# Patient Record
Sex: MALE | Race: WHITE | NOT HISPANIC OR LATINO | Employment: OTHER | URBAN - METROPOLITAN AREA
[De-identification: names, ages, dates, MRNs, and addresses within clinical notes are randomized per-mention and may not be internally consistent; named-entity substitution may affect disease eponyms.]

---

## 2018-03-27 ENCOUNTER — OFFICE VISIT (OUTPATIENT)
Dept: INTERNAL MEDICINE | Facility: CLINIC | Age: 63
End: 2018-03-27

## 2018-03-27 VITALS
SYSTOLIC BLOOD PRESSURE: 142 MMHG | BODY MASS INDEX: 28.57 KG/M2 | DIASTOLIC BLOOD PRESSURE: 88 MMHG | WEIGHT: 242 LBS | OXYGEN SATURATION: 95 % | HEART RATE: 80 BPM | TEMPERATURE: 98.7 F | HEIGHT: 77 IN | RESPIRATION RATE: 16 BRPM

## 2018-03-27 DIAGNOSIS — Z00.00 ROUTINE ADULT HEALTH MAINTENANCE: Primary | ICD-10-CM

## 2018-03-27 LAB
ALBUMIN SERPL-MCNC: 4.6 G/DL (ref 3.5–5.2)
ALBUMIN/GLOB SERPL: 1.8 G/DL
ALP SERPL-CCNC: 93 U/L (ref 40–129)
ALT SERPL-CCNC: 47 U/L (ref 5–41)
AST SERPL-CCNC: 34 U/L (ref 5–40)
BASOPHILS # BLD AUTO: 0.11 10*3/MM3 (ref 0–0.2)
BASOPHILS NFR BLD AUTO: 1.5 % (ref 0–2)
BILIRUB SERPL-MCNC: 0.9 MG/DL (ref 0.2–1.2)
BUN SERPL-MCNC: 12 MG/DL (ref 8–23)
BUN/CREAT SERPL: 14.1 (ref 7–25)
CALCIUM SERPL-MCNC: 9.8 MG/DL (ref 8.8–10.5)
CHLORIDE SERPL-SCNC: 99 MMOL/L (ref 98–107)
CHOLEST SERPL-MCNC: 243 MG/DL (ref 0–200)
CO2 SERPL-SCNC: 30.1 MMOL/L (ref 22–29)
CREAT SERPL-MCNC: 0.85 MG/DL (ref 0.76–1.27)
EOSINOPHIL # BLD AUTO: 0.15 10*3/MM3 (ref 0.1–0.3)
EOSINOPHIL NFR BLD AUTO: 2 % (ref 0–4)
ERYTHROCYTE [DISTWIDTH] IN BLOOD BY AUTOMATED COUNT: 12.9 % (ref 11.5–14.5)
GFR SERPLBLD CREATININE-BSD FMLA CKD-EPI: 111 ML/MIN/1.73
GFR SERPLBLD CREATININE-BSD FMLA CKD-EPI: 91 ML/MIN/1.73
GLOBULIN SER CALC-MCNC: 2.6 GM/DL
GLUCOSE SERPL-MCNC: 106 MG/DL (ref 65–99)
HBA1C MFR BLD: 5.7 % (ref 4.8–5.6)
HCT VFR BLD AUTO: 49.7 % (ref 42–52)
HDLC SERPL-MCNC: 54 MG/DL (ref 40–60)
HGB BLD-MCNC: 16.7 G/DL (ref 14–18)
IMM GRANULOCYTES # BLD: 0.02 10*3/MM3 (ref 0–0.03)
IMM GRANULOCYTES NFR BLD: 0.3 % (ref 0–0.5)
LDLC SERPL CALC-MCNC: 139 MG/DL (ref 0–100)
LDLC/HDLC SERPL: 2.57 {RATIO}
LYMPHOCYTES # BLD AUTO: 2.14 10*3/MM3 (ref 0.6–4.8)
LYMPHOCYTES NFR BLD AUTO: 28.8 % (ref 20–45)
MCH RBC QN AUTO: 32 PG (ref 27–31)
MCHC RBC AUTO-ENTMCNC: 33.6 G/DL (ref 31–37)
MCV RBC AUTO: 95.2 FL (ref 80–94)
MONOCYTES # BLD AUTO: 0.81 10*3/MM3 (ref 0–1)
MONOCYTES NFR BLD AUTO: 10.9 % (ref 3–8)
NEUTROPHILS # BLD AUTO: 4.21 10*3/MM3 (ref 1.5–8.3)
NEUTROPHILS NFR BLD AUTO: 56.5 % (ref 45–70)
NRBC BLD AUTO-RTO: 0 /100 WBC (ref 0–0)
PLATELET # BLD AUTO: 283 10*3/MM3 (ref 140–500)
POTASSIUM SERPL-SCNC: 4.5 MMOL/L (ref 3.5–5.2)
PROT SERPL-MCNC: 7.2 G/DL (ref 6–8.5)
PSA SERPL-MCNC: 1.75 NG/ML (ref 0–4)
RBC # BLD AUTO: 5.22 10*6/MM3 (ref 4.7–6.1)
SODIUM SERPL-SCNC: 141 MMOL/L (ref 136–145)
TRIGL SERPL-MCNC: 251 MG/DL (ref 0–150)
TSH SERPL DL<=0.005 MIU/L-ACNC: 3.31 MIU/ML (ref 0.27–4.2)
VLDLC SERPL CALC-MCNC: 50.2 MG/DL (ref 8–32)
WBC # BLD AUTO: 7.44 10*3/MM3 (ref 4.8–10.8)

## 2018-03-27 PROCEDURE — 93000 ELECTROCARDIOGRAM COMPLETE: CPT | Performed by: INTERNAL MEDICINE

## 2018-03-27 PROCEDURE — 99386 PREV VISIT NEW AGE 40-64: CPT | Performed by: INTERNAL MEDICINE

## 2018-03-27 NOTE — PROGRESS NOTES
"      Juventino Johnson is a 62 y.o. male, who presents with a chief complaint of   Chief Complaint   Patient presents with   • [facility]       HPI     61 yo male witih no self reported chronic problem. He has some degenerative disc disease in his lower back. Going to chiropractor regularly. Rarely takes excedrin.     Has occasional internal type hip pain every 4 months, severe, walks off. Very rare, not present today    Rarely gets reflux symptoms at night. No cp or exertional symptoms.        The following portions of the patient's history were reviewed and updated as appropriate: allergies, current medications, past family history, past medical history, past social history, past surgical history and problem list.    Allergies: Review of patient's allergies indicates no known allergies.  No current outpatient prescriptions on file.  There are no discontinued medications.    Review of Systems   Constitutional: Negative.  Negative for unexpected weight change.   Eyes: Negative.    Respiratory: Negative.    Cardiovascular: Negative.  Negative for chest pain, palpitations and leg swelling.   Gastrointestinal:        Reflux at night   Genitourinary: Negative.    Musculoskeletal: Negative.    Neurological: Positive for headaches.   Hematological: Negative.    Psychiatric/Behavioral: Negative.  Negative for sleep disturbance.   All other systems reviewed and are negative.            /88   Pulse 80   Temp 98.7 °F (37.1 °C)   Resp 16   Ht 195.6 cm (77\")   Wt 110 kg (242 lb)   SpO2 95%   BMI 28.70 kg/m²       Physical Exam   Constitutional: He is oriented to person, place, and time. He appears well-developed and well-nourished.   HENT:   Head: Normocephalic and atraumatic.   Right Ear: External ear normal.   Left Ear: External ear normal.   Nose: Nose normal.   Mouth/Throat: No oropharyngeal exudate.   Eyes: Conjunctivae and EOM are normal. Pupils are equal, round, and reactive to light.   Neck: Normal range of " motion. Neck supple. No JVD present. No tracheal deviation present. No thyromegaly present.   Cardiovascular: Normal rate, regular rhythm, normal heart sounds and intact distal pulses.  Exam reveals no gallop and no friction rub.    No murmur heard.  Pulmonary/Chest: Effort normal and breath sounds normal.   Abdominal: Soft. Bowel sounds are normal. He exhibits no distension and no mass. There is no tenderness. There is no rebound and no guarding. No hernia.   Genitourinary: Prostate normal and penis normal. Rectal exam shows guaiac negative stool. No penile tenderness.   Musculoskeletal: Normal range of motion. He exhibits no edema.   Neurological: He is alert and oriented to person, place, and time. He has normal reflexes.   Skin: Skin is warm and dry.   Psychiatric: He has a normal mood and affect. His behavior is normal. Judgment and thought content normal.   Nursing note and vitals reviewed.      Lab Results (most recent)     None        ECG 12 Lead  Date/Time: 3/27/2018 2:31 PM  Performed by: ASIA MEZA  Authorized by: ASIA MEZA   Comparison: not compared with previous ECG   Previous ECG: no previous ECG available  Rate: normal  BPM: 81  Conduction: conduction normal  ST Segments: ST segments normal  T Waves: T waves normal  QRS axis: normal  Clinical impression: normal ECG              No results found for this or any previous visit.        Juventino was seen today for establish care.    Diagnoses and all orders for this visit:    Routine adult health maintenance  -     Comprehensive metabolic panel  -     TSH  -     PSA SCREENING  -     Lipid Panel With LDL/HDL Ratio  -     CBC w AUTO Differential  -     Hemoglobin A1c  -     DEXA Assess Fracture Vertebral      Declining vaccination up date today  Colonoscopy up to date  Reflux likely zantac 150 mg at night prn   EKG today reasuring  Monitor bp at home, goal <140/90.     Return in about 6 months (around 9/27/2018).    Asia Meza,  MD  03/27/2018

## 2018-03-27 NOTE — PATIENT INSTRUCTIONS
Juventino was seen today for establish care.    Diagnoses and all orders for this visit:    Routine adult health maintenance  -     Comprehensive metabolic panel  -     TSH  -     PSA SCREENING  -     Lipid Panel With LDL/HDL Ratio  -     CBC w AUTO Differential  -     Hemoglobin A1c  -     DEXA Assess Fracture Vertebral          Return in about 6 months (around 9/27/2018).    Gonzalez Meza MD  03/27/2018    Preventive Care 40-64 Years, Male  Preventive care refers to lifestyle choices and visits with your health care provider that can promote health and wellness.  What does preventive care include?  · A yearly physical exam. This is also called an annual well check.  · Dental exams once or twice a year.  · Routine eye exams. Ask your health care provider how often you should have your eyes checked.  · Personal lifestyle choices, including:  ¨ Daily care of your teeth and gums.  ¨ Regular physical activity.  ¨ Eating a healthy diet.  ¨ Avoiding tobacco and drug use.  ¨ Limiting alcohol use.  ¨ Practicing safe sex.  ¨ Taking low-dose aspirin every day starting at age 50.  What happens during an annual well check?  The services and screenings done by your health care provider during your annual well check will depend on your age, overall health, lifestyle risk factors, and family history of disease.  Counseling   Your health care provider may ask you questions about your:  · Alcohol use.  · Tobacco use.  · Drug use.  · Emotional well-being.  · Home and relationship well-being.  · Sexual activity.  · Eating habits.  · Work and work environment.  Screening   You may have the following tests or measurements:  · Height, weight, and BMI.  · Blood pressure.  · Lipid and cholesterol levels. These may be checked every 5 years, or more frequently if you are over 50 years old.  · Skin check.  · Lung cancer screening. You may have this screening every year starting at age 55 if you have a 30-pack-year history of smoking and  currently smoke or have quit within the past 15 years.  · Fecal occult blood test (FOBT) of the stool. You may have this test every year starting at age 50.  · Flexible sigmoidoscopy or colonoscopy. You may have a sigmoidoscopy every 5 years or a colonoscopy every 10 years starting at age 50.  · Prostate cancer screening. Recommendations will vary depending on your family history and other risks.  · Hepatitis C blood test.  · Hepatitis B blood test.  · Sexually transmitted disease (STD) testing.  · Diabetes screening. This is done by checking your blood sugar (glucose) after you have not eaten for a while (fasting). You may have this done every 1-3 years.  Discuss your test results, treatment options, and if necessary, the need for more tests with your health care provider.  Vaccines   Your health care provider may recommend certain vaccines, such as:  · Influenza vaccine. This is recommended every year.  · Tetanus, diphtheria, and acellular pertussis (Tdap, Td) vaccine. You may need a Td booster every 10 years.  · Varicella vaccine. You may need this if you have not been vaccinated.  · Zoster vaccine. You may need this after age 60.  · Measles, mumps, and rubella (MMR) vaccine. You may need at least one dose of MMR if you were born in 1957 or later. You may also need a second dose.  · Pneumococcal 13-valent conjugate (PCV13) vaccine. You may need this if you have certain conditions and have not been vaccinated.  · Pneumococcal polysaccharide (PPSV23) vaccine. You may need one or two doses if you smoke cigarettes or if you have certain conditions.  · Meningococcal vaccine. You may need this if you have certain conditions.  · Hepatitis A vaccine. You may need this if you have certain conditions or if you travel or work in places where you may be exposed to hepatitis A.  · Hepatitis B vaccine. You may need this if you have certain conditions or if you travel or work in places where you may be exposed to hepatitis  B.  · Haemophilus influenzae type b (Hib) vaccine. You may need this if you have certain risk factors.  Talk to your health care provider about which screenings and vaccines you need and how often you need them.  This information is not intended to replace advice given to you by your health care provider. Make sure you discuss any questions you have with your health care provider.  Document Released: 01/13/2017 Document Revised: 09/06/2017 Document Reviewed: 10/18/2016  Elsevier Interactive Patient Education © 2017 [employer] Inc.

## 2018-03-29 ENCOUNTER — TELEPHONE (OUTPATIENT)
Dept: INTERNAL MEDICINE | Facility: CLINIC | Age: 63
End: 2018-03-29

## 2018-03-29 NOTE — TELEPHONE ENCOUNTER
KAREN on patient VM returning his call.  Advised him to call me back again.    ----- Message from Luzma Paul MA sent at 3/29/2018  1:42 PM EDT -----  Regarding: FW: TEST RESULTS      ----- Message -----  From: Karina Prado  Sent: 3/29/2018  12:05 PM  To: [facility]  Subject: TEST RESULTS                                     kindig    Patient says he got message to return call about test results.      [phone]

## 2018-04-04 ENCOUNTER — TELEPHONE (OUTPATIENT)
Dept: INTERNAL MEDICINE | Facility: CLINIC | Age: 63
End: 2018-04-04

## 2018-04-04 NOTE — TELEPHONE ENCOUNTER
----- Message from Gonzalez Meza MD sent at 3/28/2018  8:02 AM EDT -----  Labs are okay.   He likely has a little B12 deficiency.  This is from daily alcohol use. He has a slight elevation in one liver enzyme as well. His chol is very high and he does qualify for medication if he is willing. Would do crestor 5 mg daily if he agrees, then needs cmp in 6 weeks. And FU 3 months.  HE was new patient so let me know what he decides.    Pt was given info and will be in office Friday o4/06/2018 dg

## 2018-04-05 ENCOUNTER — OFFICE VISIT (OUTPATIENT)
Dept: INTERNAL MEDICINE | Facility: CLINIC | Age: 63
End: 2018-04-05

## 2018-04-05 VITALS
RESPIRATION RATE: 18 BRPM | DIASTOLIC BLOOD PRESSURE: 88 MMHG | WEIGHT: 242.4 LBS | OXYGEN SATURATION: 98 % | SYSTOLIC BLOOD PRESSURE: 148 MMHG | BODY MASS INDEX: 28.62 KG/M2 | HEART RATE: 86 BPM | HEIGHT: 77 IN

## 2018-04-05 DIAGNOSIS — E78.2 MIXED HYPERLIPIDEMIA: ICD-10-CM

## 2018-04-05 DIAGNOSIS — Z78.9 ALCOHOL USE: ICD-10-CM

## 2018-04-05 DIAGNOSIS — R74.01 ELEVATED ALT MEASUREMENT: ICD-10-CM

## 2018-04-05 DIAGNOSIS — R73.02 GLUCOSE INTOLERANCE (IMPAIRED GLUCOSE TOLERANCE): Primary | ICD-10-CM

## 2018-04-05 DIAGNOSIS — I10 ESSENTIAL HYPERTENSION: ICD-10-CM

## 2018-04-05 PROBLEM — F10.90 ALCOHOL USE: Status: ACTIVE | Noted: 2018-04-05

## 2018-04-05 PROCEDURE — 99214 OFFICE O/P EST MOD 30 MIN: CPT | Performed by: INTERNAL MEDICINE

## 2018-04-05 NOTE — PATIENT INSTRUCTIONS
"Take multivitamin, B12 vitamin complex, folate, and thiamine for appropriate nutrition.    Work on decreasing alcohol intake.     Work on doing 10,000 steps of exercise/day.     Limit carbohydrates (breads, rice, pasta).      Keep blood pressure log 3 times weekly, goal <130/80 and call office with results in 1 week (you may need to start a medication to lower blood pressure).  When you check your blood pressure, check it first thing in the morning after sitting down for 15 minutes.  Do not consume any caffeine prior to checking your blood pressure.     DASH Eating Plan  DASH stands for \"Dietary Approaches to Stop Hypertension.\" The DASH eating plan is a healthy eating plan that has been shown to reduce high blood pressure (hypertension). It may also reduce your risk for type 2 diabetes, heart disease, and stroke. The DASH eating plan may also help with weight loss.  What are tips for following this plan?  General guidelines   · Avoid eating more than 2,300 mg (milligrams) of salt (sodium) a day. If you have hypertension, you may need to reduce your sodium intake to 1,500 mg a day.  · Limit alcohol intake to no more than 1 drink a day for nonpregnant women and 2 drinks a day for men. One drink equals 12 oz of beer, 5 oz of wine, or 1½ oz of hard liquor.  · Work with your health care provider to maintain a healthy body weight or to lose weight. Ask what an ideal weight is for you.  · Get at least 30 minutes of exercise that causes your heart to beat faster (aerobic exercise) most days of the week. Activities may include walking, swimming, or biking.  · Work with your health care provider or diet and nutrition specialist (dietitian) to adjust your eating plan to your individual calorie needs.  Reading food labels   · Check food labels for the amount of sodium per serving. Choose foods with less than 5 percent of the Daily Value of sodium. Generally, foods with less than 300 mg of sodium per serving fit into this " "eating plan.  · To find whole grains, look for the word \"whole\" as the first word in the ingredient list.  Shopping   · Buy products labeled as \"low-sodium\" or \"no salt added.\"  · Buy fresh foods. Avoid canned foods and premade or frozen meals.  Cooking   · Avoid adding salt when cooking. Use salt-free seasonings or herbs instead of table salt or sea salt. Check with your health care provider or pharmacist before using salt substitutes.  · Do not gage foods. Cook foods using healthy methods such as baking, boiling, grilling, and broiling instead.  · Cook with heart-healthy oils, such as olive, canola, soybean, or sunflower oil.  Meal planning     · Eat a balanced diet that includes:  ¨ 5 or more servings of fruits and vegetables each day. At each meal, try to fill half of your plate with fruits and vegetables.  ¨ Up to 6-8 servings of whole grains each day.  ¨ Less than 6 oz of lean meat, poultry, or fish each day. A 3-oz serving of meat is about the same size as a deck of cards. One egg equals 1 oz.  ¨ 2 servings of low-fat dairy each day.  ¨ A serving of nuts, seeds, or beans 5 times each week.  ¨ Heart-healthy fats. Healthy fats called Omega-3 fatty acids are found in foods such as flaxseeds and coldwater fish, like sardines, salmon, and mackerel.  · Limit how much you eat of the following:  ¨ Canned or prepackaged foods.  ¨ Food that is high in trans fat, such as fried foods.  ¨ Food that is high in saturated fat, such as fatty meat.  ¨ Sweets, desserts, sugary drinks, and other foods with added sugar.  ¨ Full-fat dairy products.  · Do not salt foods before eating.  · Try to eat at least 2 vegetarian meals each week.  · Eat more home-cooked food and less restaurant, buffet, and fast food.  · When eating at a restaurant, ask that your food be prepared with less salt or no salt, if possible.  What foods are recommended?  The items listed may not be a complete list. Talk with your dietitian about what dietary " choices are best for you.  Grains   Whole-grain or whole-wheat bread. Whole-grain or whole-wheat pasta. Brown rice. Oatmeal. Quinoa. Bulgur. Whole-grain and low-sodium cereals. Birdie bread. Low-fat, low-sodium crackers. Whole-wheat flour tortillas.  Vegetables   Fresh or frozen vegetables (raw, steamed, roasted, or grilled). Low-sodium or reduced-sodium tomato and vegetable juice. Low-sodium or reduced-sodium tomato sauce and tomato paste. Low-sodium or reduced-sodium canned vegetables.  Fruits   All fresh, dried, or frozen fruit. Canned fruit in natural juice (without added sugar).  Meat and other protein foods   Skinless chicken or turkey. Ground chicken or turkey. Pork with fat trimmed off. Fish and seafood. Egg whites. Dried beans, peas, or lentils. Unsalted nuts, nut butters, and seeds. Unsalted canned beans. Lean cuts of beef with fat trimmed off. Low-sodium, lean deli meat.  Dairy   Low-fat (1%) or fat-free (skim) milk. Fat-free, low-fat, or reduced-fat cheeses. Nonfat, low-sodium ricotta or cottage cheese. Low-fat or nonfat yogurt. Low-fat, low-sodium cheese.  Fats and oils   Soft margarine without trans fats. Vegetable oil. Low-fat, reduced-fat, or light mayonnaise and salad dressings (reduced-sodium). Canola, safflower, olive, soybean, and sunflower oils. Avocado.  Seasoning and other foods   Herbs. Spices. Seasoning mixes without salt. Unsalted popcorn and pretzels. Fat-free sweets.  What foods are not recommended?  The items listed may not be a complete list. Talk with your dietitian about what dietary choices are best for you.  Grains   Baked goods made with fat, such as croissants, muffins, or some breads. Dry pasta or rice meal packs.  Vegetables   Creamed or fried vegetables. Vegetables in a cheese sauce. Regular canned vegetables (not low-sodium or reduced-sodium). Regular canned tomato sauce and paste (not low-sodium or reduced-sodium). Regular tomato and vegetable juice (not low-sodium or  reduced-sodium). Pickles. Olives.  Fruits   Canned fruit in a light or heavy syrup. Fried fruit. Fruit in cream or butter sauce.  Meat and other protein foods   Fatty cuts of meat. Ribs. Fried meat. Sauceda. Sausage. Bologna and other processed lunch meats. Salami. Fatback. Hotdogs. Bratwurst. Salted nuts and seeds. Canned beans with added salt. Canned or smoked fish. Whole eggs or egg yolks. Chicken or turkey with skin.  Dairy   Whole or 2% milk, cream, and half-and-half. Whole or full-fat cream cheese. Whole-fat or sweetened yogurt. Full-fat cheese. Nondairy creamers. Whipped toppings. Processed cheese and cheese spreads.  Fats and oils   Butter. Stick margarine. Lard. Shortening. Ghee. Sauceda fat. Tropical oils, such as coconut, palm kernel, or palm oil.  Seasoning and other foods   Salted popcorn and pretzels. Onion salt, garlic salt, seasoned salt, table salt, and sea salt. Worcestershire sauce. Tartar sauce. Barbecue sauce. Teriyaki sauce. Soy sauce, including reduced-sodium. Steak sauce. Canned and packaged gravies. Fish sauce. Oyster sauce. Cocktail sauce. Horseradish that you find on the shelf. Ketchup. Mustard. Meat flavorings and tenderizers. Bouillon cubes. Hot sauce and Tabasco sauce. Premade or packaged marinades. Premade or packaged taco seasonings. Relishes. Regular salad dressings.  Where to find more information:  · National Heart, Lung, and Blood [place]: www.nhlbi.nih.gov  · American Heart Association: www.heart.org  Summary  · The DASH eating plan is a healthy eating plan that has been shown to reduce high blood pressure (hypertension). It may also reduce your risk for type 2 diabetes, heart disease, and stroke.  · With the DASH eating plan, you should limit salt (sodium) intake to 2,300 mg a day. If you have hypertension, you may need to reduce your sodium intake to 1,500 mg a day.  · When on the DASH eating plan, aim to eat more fresh fruits and vegetables, whole grains, lean proteins, low-fat  dairy, and heart-healthy fats.  · Work with your health care provider or diet and nutrition specialist (dietitian) to adjust your eating plan to your individual calorie needs.  This information is not intended to replace advice given to you by your health care provider. Make sure you discuss any questions you have with your health care provider.  Document Released: 12/06/2012 Document Revised: 12/11/2017 Document Reviewed: 12/11/2017  [employer] Interactive Patient Education © 2017 Elsevier Inc.

## 2018-04-05 NOTE — PROGRESS NOTES
Subjective   Juventino Johnson is a 62 y.o. male.     History of Present Illness     The following portions of the patient's history were reviewed and updated as appropriate: allergies, current medications, past family history, past medical history, past social history, past surgical history and problem list.    Juventino Johnson is a 62 y.o. male who presents for follow-up.  Here to discuss lab results.  He received a call and knows his B12 is abnormal and cholesterol is abnormal.  He states he eats a lot of meats (fish, lamb, red, white), eggs.  He admits to not eating a lot of salads.  He states he drinks coffee, juice, wine (2 glasses of wine a day, beers for lunch).  Denies TOB, illicits.  He doesn't exercise.  No FH of heart disease. He would like to try conservative methods first to improve (dieting and exercise).      Review of Systems   Constitutional: Negative for activity change, appetite change and fever.   HENT: Negative for congestion and rhinorrhea.    Eyes: Negative for pain and discharge.   Respiratory: Negative for chest tightness, shortness of breath and wheezing.    Cardiovascular: Negative for chest pain, palpitations and leg swelling.   Gastrointestinal: Negative for abdominal pain, constipation, diarrhea, nausea and vomiting.   Endocrine: Positive for heat intolerance. Negative for cold intolerance.   Genitourinary: Negative for dysuria and hematuria.   Musculoskeletal: Negative for back pain and gait problem.   Skin: Negative for rash and wound.   Neurological: Negative for dizziness, syncope and light-headedness.   Hematological: Negative for adenopathy. Does not bruise/bleed easily.   Psychiatric/Behavioral: Negative for sleep disturbance and suicidal ideas.       Objective   Physical Exam   Constitutional: He is oriented to person, place, and time. He appears well-developed and well-nourished. No distress.   HENT:   Head: Normocephalic and atraumatic.   Right Ear: External ear normal.   Left Ear:  External ear normal.   Nose: Nose normal.   Mouth/Throat: Oropharynx is clear and moist. No oropharyngeal exudate.   Eyes: Conjunctivae and EOM are normal. Pupils are equal, round, and reactive to light. Right eye exhibits no discharge. Left eye exhibits no discharge. No scleral icterus.   Neck: Normal range of motion. Neck supple. No JVD present. No thyromegaly present.   Cardiovascular: Normal rate, regular rhythm, normal heart sounds and intact distal pulses.  Exam reveals no gallop and no friction rub.    No murmur heard.  Pulmonary/Chest: Effort normal and breath sounds normal. No respiratory distress. He has no wheezes. He has no rales. He exhibits no tenderness.   Abdominal: Soft. Bowel sounds are normal. He exhibits no distension and no mass. There is no tenderness. There is no rebound and no guarding. No hernia.   Musculoskeletal: Normal range of motion. He exhibits no tenderness.   Lymphadenopathy:     He has no cervical adenopathy.   Neurological: He is alert and oriented to person, place, and time. He exhibits normal muscle tone.   Skin: Skin is warm and dry. No rash noted. He is not diaphoretic.   Psychiatric: He has a normal mood and affect. His behavior is normal.   Nursing note and vitals reviewed.    Assessment/Plan   Juventino was seen today for hyperlipidemia.    Diagnoses and all orders for this visit:    Glucose intolerance (impaired glucose tolerance)    Mixed hyperlipidemia    Essential hypertension    Elevated ALT measurement    Alcohol use      Juventino Johnson is a 62 y.o. male who presents for follow-up  Labs indicate ALT of 47, MCV of 95%, HbA1c of 5.7%, glc 106.  Cholesterol is 243, trig 251, .  BP elevated.     HLD:  - ASCVD is 14%.  Counseled patient on benefits of statin medication but patient refused medical therapy at this time.  I also counseled patient on diet/exercise benefits for health and lab abnormalities.  Patient prefers not to pursue medical therapy at this time but would  like to try diet/exercise first.  Will see back in 3-6 months and reassess efforts with repeat labs.  If unable to make good strides, will need to institute medical therapy.  Patient voices understanding of this.      ETOH use:   - With regards to alcohol use, advised him to cut back and take MV with B12, folate, and thiamine given elevated MCV and history of drug use.      Impaired fasting glc:  - Instructed pt on diet/exercise    HTN:  - Instructed pt to keep BP log and call office in 1 week.   - Counseled on diet/exercise     Juliano Mckinnon MD  Resident provider   PGY-4  IM/Ped    Patient seen and examined with resident physician, as well as independently of resident physician. Agree with assessment and plan.    Wrap up addended.  Recommend Fu lab only 3 month and FU 6 month  Spent 25 min in counseling and lab review of patient.

## 2018-07-09 DIAGNOSIS — R74.01 ELEVATED ALT MEASUREMENT: ICD-10-CM

## 2018-07-09 DIAGNOSIS — E78.2 MIXED HYPERLIPIDEMIA: ICD-10-CM

## 2018-07-09 DIAGNOSIS — R73.02 GLUCOSE INTOLERANCE (IMPAIRED GLUCOSE TOLERANCE): ICD-10-CM

## 2018-07-09 DIAGNOSIS — I10 ESSENTIAL HYPERTENSION: Primary | ICD-10-CM

## 2018-07-09 DIAGNOSIS — Z78.9 ALCOHOL USE: ICD-10-CM

## 2018-07-10 ENCOUNTER — LAB (OUTPATIENT)
Dept: INTERNAL MEDICINE | Facility: CLINIC | Age: 63
End: 2018-07-10

## 2018-07-10 DIAGNOSIS — E78.2 MIXED HYPERLIPIDEMIA: ICD-10-CM

## 2018-07-10 DIAGNOSIS — Z78.9 ALCOHOL USE: ICD-10-CM

## 2018-07-10 DIAGNOSIS — R73.02 GLUCOSE INTOLERANCE (IMPAIRED GLUCOSE TOLERANCE): ICD-10-CM

## 2018-07-10 DIAGNOSIS — R74.01 ELEVATED ALT MEASUREMENT: ICD-10-CM

## 2018-07-10 DIAGNOSIS — I10 ESSENTIAL HYPERTENSION: ICD-10-CM

## 2018-07-10 LAB
ALBUMIN SERPL-MCNC: 4.3 G/DL (ref 3.5–5.2)
ALBUMIN/GLOB SERPL: 1.6 G/DL
ALP SERPL-CCNC: 77 U/L (ref 40–129)
ALT SERPL-CCNC: 29 U/L (ref 5–41)
AST SERPL-CCNC: 29 U/L (ref 5–40)
BASOPHILS # BLD AUTO: 0.1 10*3/MM3 (ref 0–0.2)
BASOPHILS NFR BLD AUTO: 1.4 % (ref 0–2)
BILIRUB SERPL-MCNC: 0.8 MG/DL (ref 0.2–1.2)
BUN SERPL-MCNC: 10 MG/DL (ref 8–23)
BUN/CREAT SERPL: 13.3 (ref 7–25)
CALCIUM SERPL-MCNC: 9.6 MG/DL (ref 8.8–10.5)
CHLORIDE SERPL-SCNC: 104 MMOL/L (ref 98–107)
CHOLEST SERPL-MCNC: 236 MG/DL (ref 0–200)
CO2 SERPL-SCNC: 28.2 MMOL/L (ref 22–29)
CREAT SERPL-MCNC: 0.75 MG/DL (ref 0.76–1.27)
EOSINOPHIL # BLD AUTO: 0.25 10*3/MM3 (ref 0.1–0.3)
EOSINOPHIL NFR BLD AUTO: 3.5 % (ref 0–4)
ERYTHROCYTE [DISTWIDTH] IN BLOOD BY AUTOMATED COUNT: 12.6 % (ref 11.5–14.5)
GLOBULIN SER CALC-MCNC: 2.7 GM/DL
GLUCOSE SERPL-MCNC: 110 MG/DL (ref 65–99)
HCT VFR BLD AUTO: 50.5 % (ref 42–52)
HDLC SERPL-MCNC: 50 MG/DL (ref 40–60)
HGB BLD-MCNC: 16.9 G/DL (ref 14–18)
IMM GRANULOCYTES # BLD: 0.01 10*3/MM3 (ref 0–0.03)
IMM GRANULOCYTES NFR BLD: 0.1 % (ref 0–0.5)
LDLC SERPL CALC-MCNC: 134 MG/DL (ref 0–100)
LDLC/HDLC SERPL: 2.68 {RATIO}
LYMPHOCYTES # BLD AUTO: 2.6 10*3/MM3 (ref 0.6–4.8)
LYMPHOCYTES NFR BLD AUTO: 36.9 % (ref 20–45)
MCH RBC QN AUTO: 32.2 PG (ref 27–31)
MCHC RBC AUTO-ENTMCNC: 33.5 G/DL (ref 31–37)
MCV RBC AUTO: 96.2 FL (ref 80–94)
MONOCYTES # BLD AUTO: 0.73 10*3/MM3 (ref 0–1)
MONOCYTES NFR BLD AUTO: 10.4 % (ref 3–8)
NEUTROPHILS # BLD AUTO: 3.36 10*3/MM3 (ref 1.5–8.3)
NEUTROPHILS NFR BLD AUTO: 47.7 % (ref 45–70)
NRBC BLD AUTO-RTO: 0 /100 WBC (ref 0–0)
PLATELET # BLD AUTO: 261 10*3/MM3 (ref 140–500)
POTASSIUM SERPL-SCNC: 4.4 MMOL/L (ref 3.5–5.2)
PROT SERPL-MCNC: 7 G/DL (ref 6–8.5)
RBC # BLD AUTO: 5.25 10*6/MM3 (ref 4.7–6.1)
SODIUM SERPL-SCNC: 141 MMOL/L (ref 136–145)
TRIGL SERPL-MCNC: 260 MG/DL (ref 0–150)
VIT B12 SERPL-MCNC: 475 PG/ML
VLDLC SERPL CALC-MCNC: 52 MG/DL (ref 8–32)
WBC # BLD AUTO: 7.05 10*3/MM3 (ref 4.8–10.8)

## 2018-07-20 ENCOUNTER — TELEPHONE (OUTPATIENT)
Dept: INTERNAL MEDICINE | Facility: CLINIC | Age: 63
End: 2018-07-20

## 2018-07-20 NOTE — TELEPHONE ENCOUNTER
----- Message from Gonzalez Meza MD sent at 7/13/2018  5:19 PM EDT -----  Cholesterol ratio still high. Would recommend statin. He could start fish oil 1000 twice daily to garcia the triglycerides. The labs are not really different.    Pt requested info on pain management in and around [facility]. The one she is going to is closing.dg

## 2018-07-23 ENCOUNTER — OFFICE VISIT (OUTPATIENT)
Dept: INTERNAL MEDICINE | Facility: CLINIC | Age: 63
End: 2018-07-23

## 2018-07-23 VITALS
HEIGHT: 77 IN | RESPIRATION RATE: 14 BRPM | OXYGEN SATURATION: 98 % | BODY MASS INDEX: 26.33 KG/M2 | WEIGHT: 223 LBS | TEMPERATURE: 98.6 F | SYSTOLIC BLOOD PRESSURE: 136 MMHG | HEART RATE: 82 BPM | DIASTOLIC BLOOD PRESSURE: 82 MMHG

## 2018-07-23 DIAGNOSIS — E78.2 MIXED HYPERLIPIDEMIA: Primary | ICD-10-CM

## 2018-07-23 DIAGNOSIS — I10 ESSENTIAL HYPERTENSION: ICD-10-CM

## 2018-07-23 DIAGNOSIS — Z78.9 ALCOHOL USE: ICD-10-CM

## 2018-07-23 PROBLEM — R74.01 ELEVATED ALT MEASUREMENT: Status: RESOLVED | Noted: 2018-04-05 | Resolved: 2018-07-23

## 2018-07-23 PROCEDURE — 99213 OFFICE O/P EST LOW 20 MIN: CPT | Performed by: INTERNAL MEDICINE

## 2018-07-23 NOTE — PATIENT INSTRUCTIONS
Simvastatin tablets  What is this medicine?  SIMVASTATIN ([facility] stat in) is known as a HMG-CoA reductase inhibitor or 'statin'. It lowers the level of cholesterol and triglycerides in the blood. This drug may also reduce the risk of heart attack, stroke, or other health problems in patients with risk factors for heart or blood vessel disease. Diet and lifestyle changes are often used with this drug.  This medicine may be used for other purposes; ask your health care provider or pharmacist if you have questions.  COMMON BRAND NAME(S): Rene  What should I tell my health care provider before I take this medicine?  They need to know if you have any of these conditions:  -frequently drink alcoholic beverages  -kidney disease  -liver disease  -muscle aches or weakness  -other medical condition  -an unusual or allergic reaction to simvastatin, other medicines, foods, dyes, or preservatives  -pregnant or trying to get pregnant  -breast-feeding  How should I use this medicine?  Take this medicine by mouth with a glass of water. Follow the directions on the prescription label. You can take this medicine with or without food. Take your doses at regular intervals. Do not take your medicine more often than directed.  Talk to your pediatrician regarding the use of this medicine in children. Special care may be needed.  Overdosage: If you think you have taken too much of this medicine contact a poison control center or emergency room at once.  NOTE: This medicine is only for you. Do not share this medicine with others.  What if I miss a dose?  If you miss a dose, take it as soon as you can. If it is almost time for your next dose, take only that dose. Do not take double or extra doses.  What may interact with this medicine?  Do not take this medicine with any of the following medications:  -boceprevir  -cyclosporine  -danazol  -gemfibrozil  -medicines for fungal infections like itraconazole, ketoconazole, posaconazole,  voriconazole  -medicines for HIV infection  -mifepristone, RU-486  -nefazodone  -red yeast rice  -some antibiotics like clarithromycin, erythromycin, telithromycin  -telaprevir  This medicine may also interact with the following medications:  -alcohol  -amiodarone  -amlodipine  -colchicine  -digoxin  -diltiazem  -dronedarone  -fluconazole  -grapefruit juice  -niacin  -ranolazine  -verapamil  -warfarin  This list may not describe all possible interactions. Give your health care provider a list of all the medicines, herbs, non-prescription drugs, or dietary supplements you use. Also tell them if you smoke, drink alcohol, or use illegal drugs. Some items may interact with your medicine.  What should I watch for while using this medicine?  Visit your doctor or health care professional for regular check-ups. You may need regular tests to make sure your liver is working properly.  Tell your doctor or health care professional right away if you get any unexplained muscle pain, tenderness, or weakness, especially if you also have a fever and tiredness. Your doctor or health care professional may tell you to stop taking this medicine if you develop muscle problems. If your muscle problems do not go away after stopping this medicine, contact your health care professional.  This medicine may affect blood sugar levels. If you have diabetes, check with your doctor or health care professional before you change your diet or the dose of your diabetic medicine.  This drug is only part of a total heart-health program. Your doctor or a dietician can suggest a low-cholesterol and low-fat diet to help. Avoid alcohol and smoking, and keep a proper exercise schedule.  Do not use this drug if you are pregnant or breast-feeding. Serious side effects to an unborn child or to an infant are possible. Talk to your doctor or pharmacist for more information.  If you are going to have surgery tell your health care professional that you are taking  this drug.  Some drugs may increase the risk of side effects from simvastatin. If you are given certain antibiotics or antifungals, your doctor or health care professional may stop simvastatin for a short time. Check with your doctor or pharmacist for advice.  What side effects may I notice from receiving this medicine?  Side effects that you should report to your doctor or health care professional as soon as possible:  -allergic reactions like skin rash, itching or hives, swelling of the face, lips, or tongue  -confusion  -dark urine  -fever  -joint pain  -loss of memory  -muscle cramps, pain  -redness, blistering, peeling or loosening of the skin, including inside the mouth  -trouble passing urine or change in the amount of urine  -unusually weak or tired  -yellowing of the eyes or skin  Side effects that usually do not require medical attention (report to your doctor or health care professional if they continue or are bothersome):  -constipation  -heartburn  -stomach gas, pain, upset  This list may not describe all possible side effects. Call your doctor for medical advice about side effects. You may report side effects to FDA at 8-406-FDA-2469.  Where should I keep my medicine?  Keep out of the reach of children.  Store at room temperature below 30 degrees C (86 degrees F). Keep container tightly closed. Throw away any unused medicine after the expiration date.  NOTE: This sheet is a summary. It may not cover all possible information. If you have questions about this medicine, talk to your doctor, pharmacist, or health care provider.  © 2018 [employer]/Gold Standard (2012-11-06 10:40:36)    Fish oil integrative option - salmon or tuna steak twice weekly  CoQ10  Red yeast rice  Garlic

## 2018-07-23 NOTE — PROGRESS NOTES
"      Juventino Johnson is a 63 y.o. male, who presents with a chief complaint of   Chief Complaint   Patient presents with   • Follow-up     3 month f/u, lab results    • Hyperlipidemia       HPI     62yo male with pmhx HTN, HL, borderline hyperglycemia  Here for chol recheck on 3monthsof diet modification. He has cut bread, rice and noodles. Cut all fast food and red meats  Goal weight is 190 pounds  He has decreased alcohol use but still uses daily.   Very hesitant to start medication. Father lived to 80s, PGF over 100.  He feels well on the new lifestyle diet.     The following portions of the patient's history were reviewed and updated as appropriate: allergies, current medications, past family history, past medical history, past social history, past surgical history and problem list.    Allergies: Patient has no known allergies.  No current outpatient prescriptions on file.  There are no discontinued medications.    Review of Systems   Constitutional: Negative for appetite change, fatigue and unexpected weight change.   Respiratory: Negative.    Cardiovascular: Negative.    Musculoskeletal: Negative.    All other systems reviewed and are negative.            /82 (BP Location: Left arm, Patient Position: Sitting, Cuff Size: Large Adult)   Pulse 82   Temp 98.6 °F (37 °C) (Oral)   Resp 14   Ht 195.6 cm (77.01\")   Wt 101 kg (223 lb)   SpO2 98%   BMI 26.44 kg/m²       Physical Exam   Constitutional: He is oriented to person, place, and time. He appears well-developed and well-nourished.   HENT:   Head: Normocephalic and atraumatic.   Right Ear: External ear normal.   Left Ear: External ear normal.   Nose: Nose normal.   Mouth/Throat: No oropharyngeal exudate.   Eyes: Pupils are equal, round, and reactive to light. Conjunctivae and EOM are normal.   Neck: Normal range of motion. Neck supple. No thyromegaly present.   Cardiovascular: Normal rate.    No murmur heard.  Pulmonary/Chest: Effort normal and breath " sounds normal.   Abdominal: Bowel sounds are normal.   Musculoskeletal: Normal range of motion. He exhibits no edema.   Neurological: He is alert and oriented to person, place, and time. He has normal reflexes.   Skin: Skin is warm and dry.   Psychiatric: He has a normal mood and affect. His behavior is normal. Judgment and thought content normal.   Nursing note and vitals reviewed.      Lab Results (most recent)     None          Results for orders placed or performed in visit on 07/10/18   Vitamin B12   Result Value Ref Range    Vitamin B-12 475 232-1,245 pg/mL   Comprehensive metabolic panel   Result Value Ref Range    Glucose 110 (H) 65 - 99 mg/dL    BUN 10 8 - 23 mg/dL    Creatinine 0.75 (L) 0.76 - 1.27 mg/dL    eGFR Non African Am 105 >60 mL/min/1.73    eGFR African Am 127 >60 mL/min/1.73    BUN/Creatinine Ratio 13.3 7.0 - 25.0    Sodium 141 136 - 145 mmol/L    Potassium 4.4 3.5 - 5.2 mmol/L    Chloride 104 98 - 107 mmol/L    Total CO2 28.2 22.0 - 29.0 mmol/L    Calcium 9.6 8.8 - 10.5 mg/dL    Total Protein 7.0 6.0 - 8.5 g/dL    Albumin 4.30 3.50 - 5.20 g/dL    Globulin 2.7 gm/dL    A/G Ratio 1.6 g/dL    Total Bilirubin 0.8 0.2 - 1.2 mg/dL    Alkaline Phosphatase 77 40 - 129 U/L    AST (SGOT) 29 5 - 40 U/L    ALT (SGPT) 29 5 - 41 U/L   Lipid Panel With LDL/HDL Ratio   Result Value Ref Range    Total Cholesterol 236 (H) 0 - 200 mg/dL    Triglycerides 260 (H) 0 - 150 mg/dL    HDL Cholesterol 50 40 - 60 mg/dL    VLDL Cholesterol 52 (H) 8 - 32 mg/dL    LDL Cholesterol  134 (H) 0 - 100 mg/dL    LDL/HDL Ratio 2.68    CBC w AUTO Differential   Result Value Ref Range    WBC 7.05 4.80 - 10.80 10*3/mm3    RBC 5.25 4.70 - 6.10 10*6/mm3    Hemoglobin 16.9 14.0 - 18.0 g/dL    Hematocrit 50.5 42.0 - 52.0 %    MCV 96.2 (H) 80.0 - 94.0 fL    MCH 32.2 (H) 27.0 - 31.0 pg    MCHC 33.5 31.0 - 37.0 g/dL    RDW 12.6 11.5 - 14.5 %    Platelets 261 140 - 500 10*3/mm3    Neutrophil Rel % 47.7 45.0 - 70.0 %    Lymphocyte Rel % 36.9  20.0 - 45.0 %    Monocyte Rel % 10.4 (H) 3.0 - 8.0 %    Eosinophil Rel % 3.5 0.0 - 4.0 %    Basophil Rel % 1.4 0.0 - 2.0 %    Neutrophils Absolute 3.36 1.50 - 8.30 10*3/mm3    Lymphocytes Absolute 2.60 0.60 - 4.80 10*3/mm3    Monocytes Absolute 0.73 0.00 - 1.00 10*3/mm3    Eosinophils Absolute 0.25 0.10 - 0.30 10*3/mm3    Basophils Absolute 0.10 0.00 - 0.20 10*3/mm3    Immature Granulocyte Rel % 0.1 0.0 - 0.5 %    Immature Grans Absolute 0.01 0.00 - 0.03 10*3/mm3    nRBC 0.0 0.0 - 0.0 /100 WBC           Juventino was seen today for follow-up and hyperlipidemia.    Diagnoses and all orders for this visit:    Mixed hyperlipidemia    Essential hypertension    Alcohol use    Cholesterol medication discussed. Risk 14%.  Statin is recommended.   Information given about zocor, lower potency option to decrease side effects  Discussed fish oil to target triglycerides, along with alcohol avoidance  Doing wonderful on diet modifications  KUDOS on that.  Spent 15 min in care of patient >50% counseling about above.   Return in about 1 year (around 7/23/2019) for Annual physical.    Gonzalez Meza MD  07/23/2018

## 2019-04-22 ENCOUNTER — TELEPHONE (OUTPATIENT)
Dept: FAMILY MEDICINE CLINIC | Facility: CLINIC | Age: 64
End: 2019-04-22

## 2019-04-22 NOTE — TELEPHONE ENCOUNTER
"Patients wife called to schedule patient with referral center.  Patient was very ill and I was asked to triage condition.  Patient's wife states that he has had vomiting, chills, severe abdominal pain, nausea, diarrhea, and fever for two days.  Wife reports patients is a \"heavy drinker\" and has not been able to drink for two days.  I advised her to take patient to ED or [facility], she verbalized understanding.  She then requested to be transferred  back to referrals to schedule appointment.  "

## 2019-04-26 ENCOUNTER — TELEPHONE (OUTPATIENT)
Dept: FAMILY MEDICINE CLINIC | Facility: CLINIC | Age: 64
End: 2019-04-26

## 2019-04-26 ENCOUNTER — OFFICE VISIT (OUTPATIENT)
Dept: FAMILY MEDICINE CLINIC | Facility: CLINIC | Age: 64
End: 2019-04-26

## 2019-04-26 VITALS
BODY MASS INDEX: 26.42 KG/M2 | TEMPERATURE: 99.7 F | HEIGHT: 77 IN | DIASTOLIC BLOOD PRESSURE: 70 MMHG | OXYGEN SATURATION: 96 % | RESPIRATION RATE: 18 BRPM | SYSTOLIC BLOOD PRESSURE: 132 MMHG | WEIGHT: 223.8 LBS | HEART RATE: 91 BPM

## 2019-04-26 DIAGNOSIS — A08.4 VIRAL GASTROENTERITIS: ICD-10-CM

## 2019-04-26 DIAGNOSIS — E78.2 MIXED HYPERLIPIDEMIA: ICD-10-CM

## 2019-04-26 DIAGNOSIS — K57.30 DIVERTICULOSIS OF LARGE INTESTINE WITHOUT HEMORRHAGE: ICD-10-CM

## 2019-04-26 DIAGNOSIS — Z80.0 FAMILY HISTORY OF COLON CANCER: Primary | ICD-10-CM

## 2019-04-26 DIAGNOSIS — Z00.00 GENERAL MEDICAL EXAM: ICD-10-CM

## 2019-04-26 PROBLEM — I10 ESSENTIAL HYPERTENSION: Status: RESOLVED | Noted: 2018-04-05 | Resolved: 2019-04-26

## 2019-04-26 PROCEDURE — 99214 OFFICE O/P EST MOD 30 MIN: CPT | Performed by: FAMILY MEDICINE

## 2019-04-26 RX ORDER — ACETAMINOPHEN, ASPIRIN AND CAFFEINE 250; 250; 65 MG/1; MG/1; MG/1
1 TABLET, FILM COATED ORAL EVERY 6 HOURS PRN
COMMUNITY
End: 2020-10-14

## 2019-04-26 NOTE — PROGRESS NOTES
Subjective   Juventino Johnson is a 63 y.o. male is here for   Chief Complaint   Patient presents with   • Follow-up     pt went to urgent care on 4/23/19    • Abdominal Pain       History of Present Illness   States that last Friday he had some take out these food.  He had shrimp, fried rice and an egg roll.  Saturday morning he developed chills body aches and some lower abdominal pain.  He had a subjective fever as well.  Saturday also he had about 2-3 episodes of diarrhea over 24 hour.  Did not have any blood in the diarrhea.  The diarrhea started out brown and then became more clear and had a little mucus in it the end of the day.  On Sunday and Monday he no longer had diarrhea but felt like it was difficult to pass gas and he had on and off lower abdominal pain.  His stool was minimal, clear, and with a little mucous.  On Tuesday he still had chills and fever.  He went to the urgent care in [place] on Tuesday, had an abdominal x-ray and some blood work.  They diagnosed him with a gastrointestinal virus.  White blood cell count was normal.  There was no blockage identified on the x-ray.  Wednesday he had a normal solid bowel movement.  On Saturday he had some nausea but did not vomit.  He has not had any nausea or vomiting as of Sunday.  He has been having normal bowel movements and feeling better since Wednesday.  He has a family history of colon cancer.  He had a colonoscopy 2 years ago at an outpatient surgical center on [facility].  That colonoscopy 2 years ago showed polyps.  The polyps that were biopsied were all negative.  He had a follow-up colonoscopy in September 2018 at the same location.  He had polyps that were biopsied on the September 2018 colonoscopy which were also negative.  He has chronic back pain and has had x-rays that have shown spurs on his lumbar spine.  He also has degenerative disc disease.  Follows with a chiropractor.  The following portions of the patient's history were reviewed and  "updated as appropriate: allergies, current medications, past family history, past medical history, past social history, past surgical history and problem list.     reports that he has quit smoking. He has a 15.00 pack-year smoking history. He has never used smokeless tobacco. He reports that he drinks about 7.2 oz of alcohol per week. He reports that he does not use drugs.    Review of Systems   Constitutional: Negative for activity change and unexpected weight change.   Respiratory: Negative for shortness of breath and wheezing.    Cardiovascular: Negative for chest pain and palpitations.   Gastrointestinal: Positive for abdominal pain. Negative for blood in stool and constipation.   Genitourinary: Negative for difficulty urinating and hematuria.   Musculoskeletal: Negative for gait problem.   Skin: Negative for color change and rash.        PHQ-9 Depression Screening  Little interest or pleasure in doing things? 0   Feeling down, depressed, or hopeless? 0   Trouble falling or staying asleep, or sleeping too much?     Feeling tired or having little energy?     Poor appetite or overeating?     Feeling bad about yourself - or that you are a failure or have let yourself or your family down?     Trouble concentrating on things, such as reading the newspaper or watching television?     Moving or speaking so slowly that other people could have noticed? Or the opposite - being so fidgety or restless that you have been moving around a lot more than usual?     Thoughts that you would be better off dead, or of hurting yourself in some way?     PHQ-9 Total Score 0   If you checked off any problems, how difficult have these problems made it for you to do your work, take care of things at home, or get along with other people?           Objective   /70 (BP Location: Left arm, Patient Position: Sitting, Cuff Size: Adult)   Pulse 91   Temp 99.7 °F (37.6 °C) (Oral)   Resp 18   Ht 195.6 cm (77\")   Wt 102 kg (223 lb 12.8 " oz)   SpO2 96%   BMI 26.54 kg/m²   Physical Exam   Constitutional: He is oriented to person, place, and time. He appears well-developed and well-nourished. No distress.   HENT:   Head: Normocephalic and atraumatic.   Right Ear: External ear normal.   Left Ear: External ear normal.   Nose: Nose normal.   Mouth/Throat: Oropharynx is clear and moist. No oropharyngeal exudate.   Eyes: Lids are normal. Right eye exhibits no discharge. Left eye exhibits no discharge. No scleral icterus.   Neck: Trachea normal, normal range of motion and full passive range of motion without pain. Neck supple. No tracheal deviation and no edema present. No thyromegaly present.   Cardiovascular: Normal rate, regular rhythm, normal heart sounds and intact distal pulses. Exam reveals no gallop and no friction rub.   No murmur heard.  Pulmonary/Chest: Effort normal and breath sounds normal. No stridor. No tachypnea and no bradypnea. No respiratory distress. He has no decreased breath sounds. He has no wheezes. He has no rales. He exhibits no tenderness.   Abdominal: Soft. Normal appearance and bowel sounds are normal. He exhibits no distension and no mass. There is no hepatosplenomegaly. There is no tenderness. There is no rebound, no guarding and negative Maurice's sign. No hernia. Hernia confirmed negative in the ventral area.   Musculoskeletal: He exhibits no edema.   Lymphadenopathy:        Head (right side): No submental, no submandibular, no tonsillar, no preauricular, no posterior auricular and no occipital adenopathy present.        Head (left side): No submental, no submandibular, no tonsillar, no preauricular, no posterior auricular and no occipital adenopathy present.     He has no cervical adenopathy.        Right cervical: No superficial cervical, no deep cervical and no posterior cervical adenopathy present.       Left cervical: No superficial cervical, no deep cervical and no posterior cervical adenopathy present.    Neurological: He is alert and oriented to person, place, and time. He has normal strength and normal reflexes. He is not disoriented.   Skin: Skin is warm, dry and intact. Capillary refill takes less than 2 seconds. No rash noted. He is not diaphoretic. No cyanosis or erythema. No pallor. Nails show no clubbing.   Psychiatric: He has a normal mood and affect. His behavior is normal. Cognition and memory are normal.   Nursing note and vitals reviewed.      Procedures    Assessment/Plan   Diagnoses and all orders for this visit:    1. Family history of colon cancer (Primary)    2. Viral gastroenteritis  Comments:  Resolved.    3. Mixed hyperlipidemia  Comments:  Mr. Johnson is going to work on improving his diet, including cutting back significantly or cutting out breads and pastas.   Assessment & Plan:  Working on diet      4. Diverticulosis of large intestine without hemorrhage    5. General medical exam  -     CBC & Differential; Future  -     Comprehensive Metabolic Panel; Future  -     TSH; Future  -     Lipid Panel With / Chol / HDL Ratio; Future  -     UA / M With / Rflx Culture([facility] ONLY) - Urine, Clean Catch; Future

## 2019-04-26 NOTE — TELEPHONE ENCOUNTER
Pt's wife called to say yes that her  Juventino did have diverticulitis on his last colonoscopy on Sep 2018

## 2019-07-26 ENCOUNTER — OFFICE VISIT (OUTPATIENT)
Dept: FAMILY MEDICINE CLINIC | Facility: CLINIC | Age: 64
End: 2019-07-26

## 2019-07-26 ENCOUNTER — RESULTS ENCOUNTER (OUTPATIENT)
Dept: FAMILY MEDICINE CLINIC | Facility: CLINIC | Age: 64
End: 2019-07-26

## 2019-07-26 VITALS
HEIGHT: 77 IN | WEIGHT: 221 LBS | HEART RATE: 85 BPM | TEMPERATURE: 98.5 F | OXYGEN SATURATION: 98 % | DIASTOLIC BLOOD PRESSURE: 80 MMHG | RESPIRATION RATE: 14 BRPM | SYSTOLIC BLOOD PRESSURE: 140 MMHG | BODY MASS INDEX: 26.09 KG/M2

## 2019-07-26 DIAGNOSIS — M25.861 CYST OF KNEE JOINT, RIGHT: Primary | ICD-10-CM

## 2019-07-26 DIAGNOSIS — R73.02 GLUCOSE INTOLERANCE (IMPAIRED GLUCOSE TOLERANCE): ICD-10-CM

## 2019-07-26 DIAGNOSIS — E78.2 MIXED HYPERLIPIDEMIA: ICD-10-CM

## 2019-07-26 DIAGNOSIS — Z00.00 GENERAL MEDICAL EXAM: ICD-10-CM

## 2019-07-26 PROCEDURE — 99213 OFFICE O/P EST LOW 20 MIN: CPT | Performed by: FAMILY MEDICINE

## 2019-07-26 NOTE — ASSESSMENT & PLAN NOTE
This is on the right anterior lower extremity just proximal to the patella.  He is considering having this removed later in the year towards the end of 2019.

## 2019-07-26 NOTE — ASSESSMENT & PLAN NOTE
We are going to check his labs today.  We are going to check his labs today.  He is going to continue working on his diet and exercise.  We will see him back in 3 to 4 months for his annual physical exam and reassess at that time.

## 2019-07-26 NOTE — PROGRESS NOTES
Subjective   Juventino Johnson is a 64 y.o. male is here for   Chief Complaint   Patient presents with   • Hyperlipidemia       History of Present Illness     Mr. Johnson states that he has a knot on his right leg is been there for years.  Is been starting to get bigger to the point that it is irritating him a little bit.  He would like to consider having it removed.  He has not decided for certain that he wants to have that done yet.    His cholesterol was elevated and 2018.  At his last visit here on April 26 we decided he was good to work on his diet and exercise and we recheck his cholesterol in a few months.  He says he has improved his diet a little bit but he has been on vacation and was not exactly eating healthy.  He has eliminated gluten from his diet.    The following portions of the patient's history were reviewed and updated as appropriate: allergies, current medications, past family history, past medical history, past social history, past surgical history and problem list.     reports that he has quit smoking. He has a 15.00 pack-year smoking history. He has never used smokeless tobacco. He reports that he drinks about 7.2 oz of alcohol per week. He reports that he does not use drugs.    Review of Systems   Constitutional: Negative for activity change and unexpected weight change.   Respiratory: Negative for shortness of breath and wheezing.    Cardiovascular: Negative for chest pain and palpitations.   Gastrointestinal: Negative for abdominal pain, blood in stool and constipation.   Genitourinary: Negative for difficulty urinating and hematuria.   Musculoskeletal: Negative for gait problem.   Skin: Negative for color change and rash.        PHQ-9 Depression Screening  Little interest or pleasure in doing things?     Feeling down, depressed, or hopeless?     Trouble falling or staying asleep, or sleeping too much?     Feeling tired or having little energy?     Poor appetite or overeating?     Feeling bad  "about yourself - or that you are a failure or have let yourself or your family down?     Trouble concentrating on things, such as reading the newspaper or watching television?     Moving or speaking so slowly that other people could have noticed? Or the opposite - being so fidgety or restless that you have been moving around a lot more than usual?     Thoughts that you would be better off dead, or of hurting yourself in some way?     PHQ-9 Total Score     If you checked off any problems, how difficult have these problems made it for you to do your work, take care of things at home, or get along with other people?           Objective   /80   Pulse 85   Temp 98.5 °F (36.9 °C) (Oral)   Resp 14   Ht 195.6 cm (77\")   Wt 100 kg (221 lb)   SpO2 98%   BMI 26.21 kg/m²   Physical Exam   Constitutional: He is oriented to person, place, and time. He appears well-developed and well-nourished. No distress.   HENT:   Head: Normocephalic and atraumatic.   Right Ear: External ear normal.   Left Ear: External ear normal.   Nose: Nose normal.   Mouth/Throat: Oropharynx is clear and moist. No oropharyngeal exudate.   Eyes: Lids are normal. Right eye exhibits no discharge. Left eye exhibits no discharge. No scleral icterus.   Neck: Trachea normal, normal range of motion and full passive range of motion without pain. Neck supple. No tracheal deviation and no edema present. No thyromegaly present.   Cardiovascular: Normal rate, regular rhythm, normal heart sounds and intact distal pulses. Exam reveals no gallop and no friction rub.   No murmur heard.  Pulmonary/Chest: Effort normal and breath sounds normal. No stridor. No tachypnea and no bradypnea. No respiratory distress. He has no decreased breath sounds. He has no wheezes. He has no rales. He exhibits no tenderness.   Abdominal: Normal appearance. There is no hepatosplenomegaly.   Musculoskeletal: He exhibits no edema.   Lymphadenopathy:        Head (right side): No " submental, no submandibular, no tonsillar, no preauricular, no posterior auricular and no occipital adenopathy present.        Head (left side): No submental, no submandibular, no tonsillar, no preauricular, no posterior auricular and no occipital adenopathy present.     He has no cervical adenopathy.        Right cervical: No superficial cervical, no deep cervical and no posterior cervical adenopathy present.       Left cervical: No superficial cervical, no deep cervical and no posterior cervical adenopathy present.   Neurological: He is alert and oriented to person, place, and time. He has normal strength and normal reflexes. He is not disoriented.   Skin: Skin is warm, dry and intact. Capillary refill takes less than 2 seconds. No rash noted. He is not diaphoretic. No cyanosis or erythema. No pallor. Nails show no clubbing.   There is a 5 x 5 cm lipoma on the right lower extremity just proximal to the patella.  It is mobile, nontender, not erythematous.   Psychiatric: He has a normal mood and affect. His behavior is normal. Cognition and memory are normal.   Nursing note and vitals reviewed.      Procedures    Assessment/Plan   Diagnoses and all orders for this visit:    1. Cyst of knee joint, right (Primary)  Assessment & Plan:  This is on the right anterior lower extremity just proximal to the patella.  He is considering having this removed later in the year towards the end of 2019.      2. Mixed hyperlipidemia  Assessment & Plan:  We are going to check his labs today.  We are going to check his labs today.  He is going to continue working on his diet and exercise.  We will see him back in 3 to 4 months for his annual physical exam and reassess at that time.          3. Glucose intolerance (impaired glucose tolerance)  Assessment & Plan:  Check CMP today

## 2019-07-26 NOTE — PATIENT INSTRUCTIONS

## 2019-07-27 LAB
ALBUMIN SERPL-MCNC: 4.4 G/DL (ref 3.5–5.2)
ALBUMIN/GLOB SERPL: 1.3 G/DL
ALP SERPL-CCNC: 84 U/L (ref 39–117)
ALT SERPL-CCNC: 16 U/L (ref 1–41)
APPEARANCE UR: CLEAR
AST SERPL-CCNC: 20 U/L (ref 1–40)
BACTERIA #/AREA URNS HPF: NORMAL /HPF
BASOPHILS # BLD AUTO: 0.09 10*3/MM3 (ref 0–0.2)
BASOPHILS NFR BLD AUTO: 1.7 % (ref 0–1.5)
BILIRUB SERPL-MCNC: 0.6 MG/DL (ref 0.2–1.2)
BILIRUB UR QL STRIP: NEGATIVE
BUN SERPL-MCNC: 9 MG/DL (ref 8–23)
BUN/CREAT SERPL: 11.8 (ref 7–25)
CALCIUM SERPL-MCNC: 9.6 MG/DL (ref 8.6–10.5)
CHLORIDE SERPL-SCNC: 101 MMOL/L (ref 98–107)
CHOLEST SERPL-MCNC: 197 MG/DL (ref 0–200)
CHOLEST/HDLC SERPL: 3.86 {RATIO}
CO2 SERPL-SCNC: 26.6 MMOL/L (ref 22–29)
COLOR UR: YELLOW
CREAT SERPL-MCNC: 0.76 MG/DL (ref 0.76–1.27)
EOSINOPHIL # BLD AUTO: 0.13 10*3/MM3 (ref 0–0.4)
EOSINOPHIL NFR BLD AUTO: 2.5 % (ref 0.3–6.2)
EPI CELLS #/AREA URNS HPF: NORMAL /HPF
ERYTHROCYTE [DISTWIDTH] IN BLOOD BY AUTOMATED COUNT: 12.8 % (ref 12.3–15.4)
GLOBULIN SER CALC-MCNC: 3.3 GM/DL
GLUCOSE SERPL-MCNC: 101 MG/DL (ref 65–99)
GLUCOSE UR QL: NEGATIVE
HCT VFR BLD AUTO: 51.3 % (ref 37.5–51)
HDLC SERPL-MCNC: 51 MG/DL (ref 40–60)
HGB BLD-MCNC: 17.1 G/DL (ref 13–17.7)
HGB UR QL STRIP: ABNORMAL
IMM GRANULOCYTES # BLD AUTO: 0.01 10*3/MM3 (ref 0–0.05)
IMM GRANULOCYTES NFR BLD AUTO: 0.2 % (ref 0–0.5)
KETONES UR QL STRIP: NEGATIVE
LDLC SERPL CALC-MCNC: 117 MG/DL (ref 0–100)
LEUKOCYTE ESTERASE UR QL STRIP: NEGATIVE
LYMPHOCYTES # BLD AUTO: 1.59 10*3/MM3 (ref 0.7–3.1)
LYMPHOCYTES NFR BLD AUTO: 30.8 % (ref 19.6–45.3)
MCH RBC QN AUTO: 31.7 PG (ref 26.6–33)
MCHC RBC AUTO-ENTMCNC: 33.3 G/DL (ref 31.5–35.7)
MCV RBC AUTO: 95.2 FL (ref 79–97)
MICRO URNS: ABNORMAL
MONOCYTES # BLD AUTO: 0.5 10*3/MM3 (ref 0.1–0.9)
MONOCYTES NFR BLD AUTO: 9.7 % (ref 5–12)
NEUTROPHILS # BLD AUTO: 2.84 10*3/MM3 (ref 1.7–7)
NEUTROPHILS NFR BLD AUTO: 55.1 % (ref 42.7–76)
NITRITE UR QL STRIP: NEGATIVE
NRBC BLD AUTO-RTO: 0 /100 WBC (ref 0–0.2)
PH UR STRIP: 7 [PH] (ref 5–7.5)
PLATELET # BLD AUTO: 264 10*3/MM3 (ref 140–450)
POTASSIUM SERPL-SCNC: 4.4 MMOL/L (ref 3.5–5.2)
PROT SERPL-MCNC: 7.7 G/DL (ref 6–8.5)
PROT UR QL STRIP: NEGATIVE
RBC # BLD AUTO: 5.39 10*6/MM3 (ref 4.14–5.8)
RBC #/AREA URNS HPF: NORMAL /HPF
SODIUM SERPL-SCNC: 141 MMOL/L (ref 136–145)
SP GR UR: 1.01 (ref 1–1.03)
TRIGL SERPL-MCNC: 144 MG/DL (ref 0–150)
TSH SERPL DL<=0.005 MIU/L-ACNC: 2.15 MIU/ML (ref 0.27–4.2)
URINALYSIS REFLEX: ABNORMAL
UROBILINOGEN UR STRIP-MCNC: 0.2 MG/DL (ref 0.2–1)
VLDLC SERPL CALC-MCNC: 28.8 MG/DL
WBC # BLD AUTO: 5.16 10*3/MM3 (ref 3.4–10.8)
WBC #/AREA URNS HPF: NORMAL /HPF

## 2019-07-29 ENCOUNTER — RESULTS ENCOUNTER (OUTPATIENT)
Dept: FAMILY MEDICINE CLINIC | Facility: CLINIC | Age: 64
End: 2019-07-29

## 2019-07-29 DIAGNOSIS — N02.9 IDIOPATHIC HEMATURIA, UNSPECIFIED WHETHER GLOMERULAR MORPHOLOGIC CHANGES PRESENT: Primary | ICD-10-CM

## 2019-07-29 DIAGNOSIS — N02.9 IDIOPATHIC HEMATURIA, UNSPECIFIED WHETHER GLOMERULAR MORPHOLOGIC CHANGES PRESENT: ICD-10-CM

## 2019-07-29 PROBLEM — R73.01 IFG (IMPAIRED FASTING GLUCOSE): Status: ACTIVE | Noted: 2019-07-29

## 2019-07-29 NOTE — PROGRESS NOTES
Please call the patient regarding his abnormal result.  Please let him know that he has slightly elevated blood sugar.  I recommend he eliminate any and all sweetened and unsweetened beverages, as well as breads, pastas, and sweets from his diet.  He also has a small amount of blood in his urine.  He needs to repeat the urinalysis at his earliest convenience to see if he still has blood in the urine.  If the repeat urinalysis does not show any blood, we will continue to monitor.  If the repeat urinalysis shows blood also, we will refer to urology.  I have already placed the order for the urinalysis.

## 2019-09-18 ENCOUNTER — OFFICE VISIT (OUTPATIENT)
Dept: INTERNAL MEDICINE | Facility: CLINIC | Age: 64
End: 2019-09-18

## 2019-09-18 ENCOUNTER — TELEPHONE (OUTPATIENT)
Dept: INTERNAL MEDICINE | Facility: CLINIC | Age: 64
End: 2019-09-18

## 2019-09-18 VITALS
OXYGEN SATURATION: 97 % | TEMPERATURE: 98.1 F | BODY MASS INDEX: 25.27 KG/M2 | HEART RATE: 77 BPM | WEIGHT: 214 LBS | HEIGHT: 77 IN | DIASTOLIC BLOOD PRESSURE: 74 MMHG | SYSTOLIC BLOOD PRESSURE: 110 MMHG | RESPIRATION RATE: 16 BRPM

## 2019-09-18 DIAGNOSIS — R73.01 IFG (IMPAIRED FASTING GLUCOSE): ICD-10-CM

## 2019-09-18 DIAGNOSIS — M25.861 CYST OF KNEE JOINT, RIGHT: ICD-10-CM

## 2019-09-18 DIAGNOSIS — M54.42 ACUTE MIDLINE LOW BACK PAIN WITH LEFT-SIDED SCIATICA: Primary | ICD-10-CM

## 2019-09-18 DIAGNOSIS — R31.21 ASYMPTOMATIC MICROSCOPIC HEMATURIA: ICD-10-CM

## 2019-09-18 DIAGNOSIS — Z23 NEED FOR PNEUMOCOCCAL VACCINATION: ICD-10-CM

## 2019-09-18 DIAGNOSIS — E78.2 MIXED HYPERLIPIDEMIA: ICD-10-CM

## 2019-09-18 DIAGNOSIS — Z23 NEED FOR INFLUENZA VACCINATION: ICD-10-CM

## 2019-09-18 PROBLEM — K57.30 DIVERTICULOSIS OF LARGE INTESTINE WITHOUT HEMORRHAGE: Status: ACTIVE | Noted: 2019-09-18

## 2019-09-18 PROBLEM — F10.90 ALCOHOL USE: Status: RESOLVED | Noted: 2018-04-05 | Resolved: 2019-09-18

## 2019-09-18 PROBLEM — Z78.9 ALCOHOL USE: Status: RESOLVED | Noted: 2018-04-05 | Resolved: 2019-09-18

## 2019-09-18 LAB
APPEARANCE UR: CLEAR
BACTERIA #/AREA URNS HPF: ABNORMAL /HPF
BILIRUB UR QL STRIP: NEGATIVE
CASTS URNS MICRO: ABNORMAL
COLOR UR: YELLOW
EPI CELLS #/AREA URNS HPF: ABNORMAL /HPF
GLUCOSE UR QL: NEGATIVE
HGB UR QL STRIP: (no result)
KETONES UR QL STRIP: NEGATIVE
LEUKOCYTE ESTERASE UR QL STRIP: NEGATIVE
NITRITE UR QL STRIP: NEGATIVE
PH UR STRIP: 6.5 [PH] (ref 5–8)
PROT UR QL STRIP: NEGATIVE
RBC #/AREA URNS HPF: ABNORMAL /HPF
SP GR UR: 1.02 (ref 1–1.03)
UROBILINOGEN UR STRIP-MCNC: (no result) MG/DL
WBC #/AREA URNS HPF: ABNORMAL /HPF

## 2019-09-18 PROCEDURE — 90674 CCIIV4 VAC NO PRSV 0.5 ML IM: CPT | Performed by: NURSE PRACTITIONER

## 2019-09-18 PROCEDURE — 99214 OFFICE O/P EST MOD 30 MIN: CPT | Performed by: NURSE PRACTITIONER

## 2019-09-18 PROCEDURE — 90670 PCV13 VACCINE IM: CPT | Performed by: NURSE PRACTITIONER

## 2019-09-18 PROCEDURE — 90472 IMMUNIZATION ADMIN EACH ADD: CPT | Performed by: NURSE PRACTITIONER

## 2019-09-18 PROCEDURE — 90471 IMMUNIZATION ADMIN: CPT | Performed by: NURSE PRACTITIONER

## 2019-09-18 NOTE — TELEPHONE ENCOUNTER
PT advised to contact PT wife to fine out when colonoscopy is PT wife stated she was not home but would contact the office tomorrow with information.

## 2019-09-18 NOTE — PROGRESS NOTES
"Subjective    Juventino Johnson is a 64 y.o. male presenting today for   Chief Complaint   Patient presents with   • Establish Care   • Back Pain       History of Present Illness     Mr. Johnson presents to establish care. His previous PCP was Dr. Lorenzo, and prior to that Dr. Meza. His current/chronic health conditions include:    Patient Active Problem List   Diagnosis   • Mixed hyperlipidemia   • Cyst of knee joint, right   • IFG (impaired fasting glucose)   • Asymptomatic microscopic hematuria   • Diverticulosis of large intestine without hemorrhage     Cyst in knee seems to be getting bigger and becoming \"more annoying.\" Contemplating having it removed.    \"Bone spurs\" in back, chronic pain, getting worse but still tolerable. Not taking any OTCs. Worse at night. Waking him up multiple times each night. Mobility/flexibility is significantly decreased. Spasms. Radiates to L leg. No bowel or bladder incontinence.    Hematuria at last check w/ Dr. Lorenzo. Did not have recheck.    Reports he had colonoscopy earlier this yr but cannot recall name of DrLeonardo     Trying to cut back of sweets and carbs. Has switched to gluten free breads and pastas.    The following portions of the patient's history were reviewed and updated as appropriate: allergies, current medications, past family history, past medical history, past social history, past surgical history and problem list.    Review of Systems   Constitutional: Negative for fatigue and fever.   Respiratory: Negative for shortness of breath.    Cardiovascular: Negative for chest pain, palpitations and leg swelling.   Gastrointestinal: Negative for diarrhea and vomiting.   Musculoskeletal: Positive for back pain.   Skin: Positive for skin lesions.   Neurological: Negative for dizziness and headache.   All other systems reviewed and are negative.      Objective    Vitals:    09/18/19 1539   BP: 110/74   BP Location: Left arm   Patient Position: Sitting   Cuff Size: Adult   Pulse: " "77   Resp: 16   Temp: 98.1 °F (36.7 °C)   TempSrc: Oral   SpO2: 97%   Weight: 97.1 kg (214 lb)   Height: 195.6 cm (77\")     Nursing notes and vitals reviewed.    Physical Exam   Constitutional: He is oriented to person, place, and time. He appears well-developed and well-nourished.   HENT:   Head: Normocephalic.   Right Ear: Hearing, tympanic membrane, external ear and ear canal normal.   Left Ear: Hearing, tympanic membrane, external ear and ear canal normal.   Nose: Nose normal. No mucosal edema or rhinorrhea.   Mouth/Throat: Uvula is midline, oropharynx is clear and moist and mucous membranes are normal. No tonsillar exudate.   Eyes: Conjunctivae, EOM and lids are normal. Pupils are equal, round, and reactive to light.   Neck: Normal range of motion. Neck supple. No thyroid mass and no thyromegaly present.   Cardiovascular: Regular rhythm, S1 normal, S2 normal and normal pulses. Exam reveals no gallop and no friction rub.   No murmur heard.  Pulmonary/Chest: Effort normal and breath sounds normal. He has no wheezes. He has no rhonchi. He has no rales.   Abdominal: Soft. Normal appearance and bowel sounds are normal. There is no hepatosplenomegaly. There is no tenderness. There is no guarding. No hernia.   Musculoskeletal: Normal range of motion.   No deformities, edema, or crepitus.   Lymphadenopathy:     He has no cervical adenopathy.   Neurological: He is alert and oriented to person, place, and time. He has normal strength and normal reflexes. No cranial nerve deficit or sensory deficit.   Skin: Skin is warm, dry and intact. No lesion and no rash noted.   Psychiatric: He has a normal mood and affect. His speech is normal and behavior is normal. Thought content normal. He is attentive.       Assessment and Plan    Juventino was seen today for establish care and back pain.    Diagnoses and all orders for this visit:    Acute midline low back pain with left-sided sciatica  -     XR Spine Lumbar 2 or 3 " View    Asymptomatic microscopic hematuria  -     Urinalysis With Microscopic - Urine, Clean Catch    Cyst of knee joint, right  Comments:  - pt will think about refer to surg    Mixed hyperlipidemia  Comments:  - last labs significantly improved from one yr ago  - continue w/ TLCs    IFG (impaired fasting glucose)  Comments:  - most recent fasting   - continue w/ TLCs    Need for influenza vaccination  -     Flucelvax Quad=>4Years ([id number])    Need for pneumococcal vaccination  -     Pneumococcal Conjugate Vaccine 13-Valent All (PCV13)      Return in about 6 months (around 3/18/2020) for Annual physical.

## 2019-09-20 DIAGNOSIS — R31.21 ASYMPTOMATIC MICROSCOPIC HEMATURIA: Primary | ICD-10-CM

## 2019-11-06 ENCOUNTER — TRANSCRIBE ORDERS (OUTPATIENT)
Dept: ADMINISTRATIVE | Facility: HOSPITAL | Age: 64
End: 2019-11-06

## 2019-11-06 DIAGNOSIS — R31.21 ASYMPTOMATIC MICROSCOPIC HEMATURIA: Primary | ICD-10-CM

## 2019-11-13 ENCOUNTER — HOSPITAL ENCOUNTER (OUTPATIENT)
Dept: CT IMAGING | Facility: HOSPITAL | Age: 64
Discharge: HOME | End: 2019-11-13
Admitting: UROLOGY

## 2019-11-13 DIAGNOSIS — R31.21 ASYMPTOMATIC MICROSCOPIC HEMATURIA: ICD-10-CM

## 2019-11-13 LAB — CREAT BLDA-MCNC: 0.8 MG/DL (ref 0.6–1.3)

## 2019-11-13 PROCEDURE — 74178 CT ABD&PLV WO CNTR FLWD CNTR: CPT

## 2019-11-13 PROCEDURE — 0 DIATRIZOATE MEGLUMINE & SODIUM PER 1 ML: Performed by: UROLOGY

## 2019-11-13 PROCEDURE — 82565 ASSAY OF CREATININE: CPT

## 2019-11-13 PROCEDURE — 0 IOPAMIDOL PER 1 ML: Performed by: UROLOGY

## 2019-11-13 RX ADMIN — DIATRIZOATE MEGLUMINE AND DIATRIZOATE SODIUM 30 ML: 600; 100 SOLUTION ORAL; RECTAL at 10:15

## 2019-11-13 RX ADMIN — IOPAMIDOL 100 ML: 755 INJECTION, SOLUTION INTRAVENOUS at 11:50

## 2020-03-16 DIAGNOSIS — Z00.00 ROUTINE ADULT HEALTH MAINTENANCE: ICD-10-CM

## 2020-03-16 DIAGNOSIS — E78.2 MIXED HYPERLIPIDEMIA: Primary | ICD-10-CM

## 2020-03-16 DIAGNOSIS — Z11.59 NEED FOR HEPATITIS C SCREENING TEST: ICD-10-CM

## 2020-03-16 DIAGNOSIS — R73.01 IFG (IMPAIRED FASTING GLUCOSE): ICD-10-CM

## 2020-03-17 ENCOUNTER — RESULTS ENCOUNTER (OUTPATIENT)
Dept: INTERNAL MEDICINE | Facility: CLINIC | Age: 65
End: 2020-03-17

## 2020-03-17 DIAGNOSIS — Z11.59 NEED FOR HEPATITIS C SCREENING TEST: ICD-10-CM

## 2020-03-17 DIAGNOSIS — R73.01 IFG (IMPAIRED FASTING GLUCOSE): ICD-10-CM

## 2020-03-17 DIAGNOSIS — Z00.00 ROUTINE ADULT HEALTH MAINTENANCE: ICD-10-CM

## 2020-03-17 DIAGNOSIS — E78.2 MIXED HYPERLIPIDEMIA: ICD-10-CM

## 2020-03-18 LAB
ALBUMIN SERPL-MCNC: 4.2 G/DL (ref 3.5–5.2)
ALBUMIN/GLOB SERPL: 1.4 G/DL
ALP SERPL-CCNC: 87 U/L (ref 39–117)
ALT SERPL-CCNC: 22 U/L (ref 1–41)
AST SERPL-CCNC: 20 U/L (ref 1–40)
BASOPHILS # BLD AUTO: 0.08 10*3/MM3 (ref 0–0.2)
BASOPHILS NFR BLD AUTO: 1.3 % (ref 0–1.5)
BILIRUB SERPL-MCNC: 0.8 MG/DL (ref 0.2–1.2)
BUN SERPL-MCNC: 16 MG/DL (ref 8–23)
BUN/CREAT SERPL: 18 (ref 7–25)
CALCIUM SERPL-MCNC: 8.9 MG/DL (ref 8.6–10.5)
CHLORIDE SERPL-SCNC: 103 MMOL/L (ref 98–107)
CHOLEST SERPL-MCNC: 225 MG/DL (ref 0–200)
CO2 SERPL-SCNC: 26.7 MMOL/L (ref 22–29)
CREAT SERPL-MCNC: 0.89 MG/DL (ref 0.76–1.27)
EOSINOPHIL # BLD AUTO: 0.22 10*3/MM3 (ref 0–0.4)
EOSINOPHIL NFR BLD AUTO: 3.4 % (ref 0.3–6.2)
ERYTHROCYTE [DISTWIDTH] IN BLOOD BY AUTOMATED COUNT: 12.3 % (ref 12.3–15.4)
GLOBULIN SER CALC-MCNC: 2.9 GM/DL
GLUCOSE SERPL-MCNC: 119 MG/DL (ref 65–99)
HBA1C MFR BLD: 5.9 % (ref 4.8–5.6)
HCT VFR BLD AUTO: 46.7 % (ref 37.5–51)
HCV AB S/CO SERPL IA: <0.1 S/CO RATIO (ref 0–0.9)
HCV AB SERPL QL IA: NORMAL
HDLC SERPL-MCNC: 51 MG/DL (ref 40–60)
HGB BLD-MCNC: 16.2 G/DL (ref 13–17.7)
IMM GRANULOCYTES # BLD AUTO: 0.01 10*3/MM3 (ref 0–0.05)
IMM GRANULOCYTES NFR BLD AUTO: 0.2 % (ref 0–0.5)
LDLC SERPL CALC-MCNC: 137 MG/DL (ref 0–100)
LDLC/HDLC SERPL: 2.69 {RATIO}
LYMPHOCYTES # BLD AUTO: 2.05 10*3/MM3 (ref 0.7–3.1)
LYMPHOCYTES NFR BLD AUTO: 32 % (ref 19.6–45.3)
MCH RBC QN AUTO: 32 PG (ref 26.6–33)
MCHC RBC AUTO-ENTMCNC: 34.7 G/DL (ref 31.5–35.7)
MCV RBC AUTO: 92.3 FL (ref 79–97)
MONOCYTES # BLD AUTO: 0.78 10*3/MM3 (ref 0.1–0.9)
MONOCYTES NFR BLD AUTO: 12.2 % (ref 5–12)
NEUTROPHILS # BLD AUTO: 3.26 10*3/MM3 (ref 1.7–7)
NEUTROPHILS NFR BLD AUTO: 50.9 % (ref 42.7–76)
NRBC BLD AUTO-RTO: 0 /100 WBC (ref 0–0.2)
PLATELET # BLD AUTO: 251 10*3/MM3 (ref 140–450)
POTASSIUM SERPL-SCNC: 4.2 MMOL/L (ref 3.5–5.2)
PROT SERPL-MCNC: 7.1 G/DL (ref 6–8.5)
RBC # BLD AUTO: 5.06 10*6/MM3 (ref 4.14–5.8)
SODIUM SERPL-SCNC: 141 MMOL/L (ref 136–145)
TRIGL SERPL-MCNC: 183 MG/DL (ref 0–150)
VLDLC SERPL CALC-MCNC: 36.6 MG/DL
WBC # BLD AUTO: 6.4 10*3/MM3 (ref 3.4–10.8)

## 2020-10-14 ENCOUNTER — HOSPITAL ENCOUNTER (EMERGENCY)
Facility: HOSPITAL | Age: 65
Discharge: HOME | End: 2020-10-14
Attending: EMERGENCY MEDICINE | Admitting: EMERGENCY MEDICINE

## 2020-10-14 ENCOUNTER — APPOINTMENT (OUTPATIENT)
Dept: GENERAL RADIOLOGY | Facility: HOSPITAL | Age: 65
End: 2020-10-14

## 2020-10-14 ENCOUNTER — TELEPHONE (OUTPATIENT)
Dept: INTERNAL MEDICINE | Facility: CLINIC | Age: 65
End: 2020-10-14

## 2020-10-14 VITALS
SYSTOLIC BLOOD PRESSURE: 127 MMHG | DIASTOLIC BLOOD PRESSURE: 90 MMHG | HEART RATE: 76 BPM | OXYGEN SATURATION: 95 % | WEIGHT: 230 LBS | BODY MASS INDEX: 27.16 KG/M2 | HEIGHT: 77 IN | TEMPERATURE: 98.4 F | RESPIRATION RATE: 17 BRPM

## 2020-10-14 DIAGNOSIS — K21.9 GASTROESOPHAGEAL REFLUX DISEASE, UNSPECIFIED WHETHER ESOPHAGITIS PRESENT: Primary | ICD-10-CM

## 2020-10-14 LAB
ALBUMIN SERPL-MCNC: 4.4 G/DL (ref 3.5–5.2)
ALBUMIN/GLOB SERPL: 1.3 G/DL
ALP SERPL-CCNC: 87 U/L (ref 39–117)
ALT SERPL W P-5'-P-CCNC: 25 U/L (ref 1–41)
ANION GAP SERPL CALCULATED.3IONS-SCNC: 12.3 MMOL/L (ref 5–15)
AST SERPL-CCNC: 29 U/L (ref 1–40)
BASOPHILS # BLD AUTO: 0.07 10*3/MM3 (ref 0–0.2)
BASOPHILS NFR BLD AUTO: 0.9 % (ref 0–1.5)
BILIRUB SERPL-MCNC: 1.1 MG/DL (ref 0–1.2)
BUN SERPL-MCNC: 13 MG/DL (ref 8–23)
BUN/CREAT SERPL: 14 (ref 7–25)
CALCIUM SPEC-SCNC: 9.8 MG/DL (ref 8.6–10.5)
CHLORIDE SERPL-SCNC: 101 MMOL/L (ref 98–107)
CO2 SERPL-SCNC: 26.7 MMOL/L (ref 22–29)
CREAT SERPL-MCNC: 0.93 MG/DL (ref 0.76–1.27)
DEPRECATED RDW RBC AUTO: 43.7 FL (ref 37–54)
EOSINOPHIL # BLD AUTO: 0.11 10*3/MM3 (ref 0–0.4)
EOSINOPHIL NFR BLD AUTO: 1.5 % (ref 0.3–6.2)
ERYTHROCYTE [DISTWIDTH] IN BLOOD BY AUTOMATED COUNT: 12 % (ref 12.3–15.4)
GFR SERPL CREATININE-BSD FRML MDRD: 82 ML/MIN/1.73
GLOBULIN UR ELPH-MCNC: 3.5 GM/DL
GLUCOSE SERPL-MCNC: 127 MG/DL (ref 65–99)
HCT VFR BLD AUTO: 52.2 % (ref 37.5–51)
HGB BLD-MCNC: 17.2 G/DL (ref 13–17.7)
IMM GRANULOCYTES # BLD AUTO: 0.01 10*3/MM3 (ref 0–0.05)
IMM GRANULOCYTES NFR BLD AUTO: 0.1 % (ref 0–0.5)
LIPASE SERPL-CCNC: 33 U/L (ref 13–60)
LYMPHOCYTES # BLD AUTO: 1.88 10*3/MM3 (ref 0.7–3.1)
LYMPHOCYTES NFR BLD AUTO: 24.8 % (ref 19.6–45.3)
MCH RBC QN AUTO: 32 PG (ref 26.6–33)
MCHC RBC AUTO-ENTMCNC: 33 G/DL (ref 31.5–35.7)
MCV RBC AUTO: 97 FL (ref 79–97)
MONOCYTES # BLD AUTO: 0.68 10*3/MM3 (ref 0.1–0.9)
MONOCYTES NFR BLD AUTO: 9 % (ref 5–12)
NEUTROPHILS NFR BLD AUTO: 4.82 10*3/MM3 (ref 1.7–7)
NEUTROPHILS NFR BLD AUTO: 63.7 % (ref 42.7–76)
PLATELET # BLD AUTO: 249 10*3/MM3 (ref 140–450)
PMV BLD AUTO: 8.7 FL (ref 6–12)
POTASSIUM SERPL-SCNC: 4.1 MMOL/L (ref 3.5–5.2)
PROT SERPL-MCNC: 7.9 G/DL (ref 6–8.5)
RBC # BLD AUTO: 5.38 10*6/MM3 (ref 4.14–5.8)
SODIUM SERPL-SCNC: 140 MMOL/L (ref 136–145)
TROPONIN T SERPL-MCNC: <0.01 NG/ML (ref 0–0.03)
WBC # BLD AUTO: 7.57 10*3/MM3 (ref 3.4–10.8)

## 2020-10-14 PROCEDURE — 93010 ELECTROCARDIOGRAM REPORT: CPT | Performed by: INTERNAL MEDICINE

## 2020-10-14 PROCEDURE — 83690 ASSAY OF LIPASE: CPT | Performed by: EMERGENCY MEDICINE

## 2020-10-14 PROCEDURE — 71046 X-RAY EXAM CHEST 2 VIEWS: CPT

## 2020-10-14 PROCEDURE — 80053 COMPREHEN METABOLIC PANEL: CPT | Performed by: EMERGENCY MEDICINE

## 2020-10-14 PROCEDURE — 84484 ASSAY OF TROPONIN QUANT: CPT | Performed by: EMERGENCY MEDICINE

## 2020-10-14 PROCEDURE — 99283 EMERGENCY DEPT VISIT LOW MDM: CPT

## 2020-10-14 PROCEDURE — 85025 COMPLETE CBC W/AUTO DIFF WBC: CPT | Performed by: EMERGENCY MEDICINE

## 2020-10-14 PROCEDURE — 93005 ELECTROCARDIOGRAM TRACING: CPT | Performed by: EMERGENCY MEDICINE

## 2020-10-14 PROCEDURE — 99282 EMERGENCY DEPT VISIT SF MDM: CPT | Performed by: EMERGENCY MEDICINE

## 2020-10-14 RX ORDER — LIDOCAINE HYDROCHLORIDE 20 MG/ML
15 SOLUTION OROPHARYNGEAL ONCE
Status: DISCONTINUED | OUTPATIENT
Start: 2020-10-14 | End: 2020-10-14 | Stop reason: HOSPADM

## 2020-10-14 RX ORDER — ALUMINA, MAGNESIA, AND SIMETHICONE 2400; 2400; 240 MG/30ML; MG/30ML; MG/30ML
15 SUSPENSION ORAL ONCE
Status: DISCONTINUED | OUTPATIENT
Start: 2020-10-14 | End: 2020-10-14 | Stop reason: HOSPADM

## 2020-10-14 RX ADMIN — SODIUM CHLORIDE, POTASSIUM CHLORIDE, SODIUM LACTATE AND CALCIUM CHLORIDE 1000 ML: 600; 310; 30; 20 INJECTION, SOLUTION INTRAVENOUS at 06:57

## 2020-10-14 NOTE — ED NOTES
"Pt refused medications at this time. Reports no \"hearburn\" currently. Pt will notify this nurse if medication is needed/wanted.      Hannah Salinas RN  10/14/20 0701    "

## 2020-10-14 NOTE — TELEPHONE ENCOUNTER
DCaller: Albina Johnson    Relationship to patient: Emergency Contact    Best call back number:[phone]  Chief complaint: GERD, ACID REFLEX, DEHYDRATION   Type of visit: ER FOLLOW UP  Requested date: AS SOON AS POSSIBLE    If rescheduling, when is the original appointment:   Additional notes:HEART BURN, DIZZINESS THIS MORNING . SEEN AT [facility].

## 2020-10-14 NOTE — ED PROVIDER NOTES
"Subjective   65-year-old male presents with \"heartburn\" since eating tacos yesterday evening.  Patient describes burning sensation in epigastric region and substernal lower mid chest.  Tried antacids which briefly relieved symptoms but they returned throughout the night.  Also reports brief episode of lightheadedness this morning.  No syncope.  No shortness of breath or cough.  No nausea or diaphoresis.  No vomiting or diarrhea.  No fevers or chills.  Felt well prior to onset of symptoms yesterday.  States he does get frequent heartburn with similar pain to what it is having today, but today symptoms have persisted and patient became concerned when he had that brief episode of lightheadedness.  Patient does state that he drinks multiple alcoholic drinks every evening.  Also states that he may be a bit dehydrated as he went for a long walk with his son yesterday.  Does report that he walks 2 to 3 miles daily and tolerates this without difficulty.  Patient is a former smoker and reports he quit approximately 20 years ago.  Has previously been told he had high blood pressure but this resolved with weight loss.  Denies any illicit drug use.          Review of Systems   All other systems reviewed and are negative.      Past Medical History:   Diagnosis Date   • Alcohol use 4/5/2018   • Diverticulosis of large intestine without hemorrhage 9/18/2019   • Hyperlipidemia    • Low back pain        No Known Allergies    Past Surgical History:   Procedure Laterality Date   • APPENDECTOMY     • EYE SURGERY Left        Family History   Problem Relation Age of Onset   • Kidney cancer Mother    • Pneumonia Father    • Colon cancer Sister    • Thyroid disease Sister    • No Known Problems Daughter    • Down syndrome Son    • Autism spectrum disorder Son    • Colon cancer Maternal Grandmother    • No Known Problems Daughter        Social History     Socioeconomic History   • Marital status:      Spouse name: Not on file   • " Number of children: Not on file   • Years of education: Not on file   • Highest education level: Not on file   Tobacco Use   • Smoking status: Former Smoker     Packs/day: 1.00     Years: 15.00     Pack years: 15.00   • Smokeless tobacco: Never Used   Substance and Sexual Activity   • Alcohol use: Yes     Alcohol/week: 12.0 standard drinks     Types: 12 Glasses of wine per week   • Drug use: No   • Sexual activity: Yes     Partners: Female   Social History Narrative    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     Appearance: Normal appearance. He is not ill-appearing, toxic-appearing or diaphoretic.   HENT:      Head: Normocephalic and atraumatic.      Nose: Nose normal.      Mouth/Throat:      Mouth: Mucous membranes are moist.      Pharynx: Oropharynx is clear.   Eyes:      Extraocular Movements: Extraocular movements intact.      Pupils: Pupils are equal, round, and reactive to light.   Neck:      Musculoskeletal: Normal range of motion and neck supple. No muscular tenderness.   Cardiovascular:      Rate and Rhythm: Normal rate and regular rhythm.      Pulses: Normal pulses.      Heart sounds: Normal heart sounds.   Pulmonary:      Effort: Pulmonary effort is normal. No respiratory distress.      Breath sounds: Normal breath sounds.   Abdominal:      General: There is no distension.      Palpations: Abdomen is soft.      Tenderness: There is no abdominal tenderness.   Musculoskeletal: Normal range of motion.         General: No swelling or tenderness.      Right lower leg: No edema.      Left lower leg: No edema.   Skin:     General: Skin is warm and dry.      Capillary Refill: Capillary refill takes less than 2 seconds.   Neurological:      General: No focal deficit present.      Mental Status: He is alert and oriented to person, place, and time.   Psychiatric:          Behavior: Behavior normal.         Thought Content: Thought content normal.         Judgment: Judgment normal.      Comments: Anxious         Procedures           ED Course  ED Course as of Oct 14 0730   Wed Oct 14, 2020   0642 EKG obtained at 0639 shows sinus rhythm, rate 96, normal DE and QTC, normal axis, no ST segment or T wave changes.    [TD]   0655 Overall well-appearing.  Description of symptoms more GI in nature than cardiopulmonary.  Abdomen nontender.  Patient's triage blood pressure is noted.  Will give GI cocktail, IV fluids, obtain labs, chest x-ray, EKG for cardiac rule out.    [TD]   0712 Patient declined GI cocktail, told nurse he did not have heartburn right now.    [TD]   0730 Patient asymptomatic here.  Work-up negative.  Advised primary care follow-up.    [TD]      ED Course User Index  [TD] Adolph Mayen MD                                           [facility]    Final diagnoses:   Gastroesophageal reflux disease, unspecified whether esophagitis present            Adolph Mayen MD  10/14/20 0797

## 2020-10-14 NOTE — ED NOTES
IV fluids still running. Pt denies any needs at this time.      Hannah Salinas, RN  10/14/20 0822

## 2020-11-02 ENCOUNTER — OFFICE VISIT (OUTPATIENT)
Dept: INTERNAL MEDICINE | Facility: CLINIC | Age: 65
End: 2020-11-02

## 2020-11-02 VITALS
TEMPERATURE: 97.2 F | DIASTOLIC BLOOD PRESSURE: 88 MMHG | SYSTOLIC BLOOD PRESSURE: 128 MMHG | WEIGHT: 219.6 LBS | HEART RATE: 83 BPM | RESPIRATION RATE: 18 BRPM | OXYGEN SATURATION: 98 % | BODY MASS INDEX: 25.93 KG/M2 | HEIGHT: 77 IN

## 2020-11-02 DIAGNOSIS — Z12.5 ENCOUNTER FOR SCREENING FOR MALIGNANT NEOPLASM OF PROSTATE: ICD-10-CM

## 2020-11-02 DIAGNOSIS — Z23 NEED FOR INFLUENZA VACCINATION: ICD-10-CM

## 2020-11-02 DIAGNOSIS — Z00.00 ROUTINE HEALTH MAINTENANCE: ICD-10-CM

## 2020-11-02 DIAGNOSIS — Z23 NEED FOR PNEUMOCOCCAL VACCINATION: ICD-10-CM

## 2020-11-02 DIAGNOSIS — R12 HEARTBURN: Primary | ICD-10-CM

## 2020-11-02 PROCEDURE — G0009 ADMIN PNEUMOCOCCAL VACCINE: HCPCS | Performed by: NURSE PRACTITIONER

## 2020-11-02 PROCEDURE — 90732 PPSV23 VACC 2 YRS+ SUBQ/IM: CPT | Performed by: NURSE PRACTITIONER

## 2020-11-02 PROCEDURE — 90694 VACC AIIV4 NO PRSRV 0.5ML IM: CPT | Performed by: NURSE PRACTITIONER

## 2020-11-02 PROCEDURE — 99213 OFFICE O/P EST LOW 20 MIN: CPT | Performed by: NURSE PRACTITIONER

## 2020-11-02 PROCEDURE — G0008 ADMIN INFLUENZA VIRUS VAC: HCPCS | Performed by: NURSE PRACTITIONER

## 2020-11-02 NOTE — PROGRESS NOTES
Subjective   Juventino Johnson is a 65 y.o. male presenting today for follow up of   Chief Complaint   Patient presents with   • Hospital Follow Up Visit     heartburn/ dehydration        History of Present Illness     Patient Active Problem List   Diagnosis   • Mixed hyperlipidemia   • Cyst of knee joint, right   • IFG (impaired fasting glucose)   • Asymptomatic microscopic hematuria   • Diverticulosis of large intestine without hemorrhage       No current outpatient medications on file.    2 wks ago he started walking daily w/ his son. Several hours after one of these walks he awoke overnight w/ a burning sensation in his chest and dizziness.    Pt presented to [facility] on 10/14/2020. He was treated for dehydration. He was offered a GI cocktail but declined this.    Heartburn has been a longstanding chronic issue. Pt sts that this is less frequent since he lost some weight. This will occur maybe once/wk. Triggered by coffee as well as certain foods (onions, peppers.)    Since his visit to the ER, he has had no further episodes of dizziness. He sts he would like to have Carotid US. He working on controlling his heartburn w/ TLCs.    He would like to get his flu vaccine and is due for pneumonia vaccine today.      The following portions of the patient's history were reviewed and updated as appropriate: allergies, current medications, past family history, past medical history, past social history, past surgical history and problem list.    Review of Systems   Constitutional: Negative.    HENT: Negative.    Eyes: Negative.    Respiratory: Negative.    Cardiovascular: Negative.    Gastrointestinal: Negative.  Positive for GERD.   Genitourinary: Negative.    Musculoskeletal: Negative.    Skin: Negative.    Neurological: Negative.    Psychiatric/Behavioral: Negative.        Objective   Vitals:    11/02/20 1006 11/02/20 1045   BP: 146/100 128/88   BP Location: Right arm    Patient Position: Standing    Cuff Size: Adult    Pulse:  "83    Resp: 18    Temp: 97.2 °F (36.2 °C)    TempSrc: Temporal    SpO2: 98%    Weight: 99.6 kg (219 lb 9.6 oz)    Height: 195.6 cm (77.01\")      Body mass index is 26.04 kg/m².  Nursing notes and vital reviewed.    Physical Exam  Constitutional:       General: He is not in acute distress.     Appearance: He is well-developed.   HENT:      Right Ear: Tympanic membrane, ear canal and external ear normal.      Left Ear: Tympanic membrane, ear canal and external ear normal.      Nose: Nose normal.      Mouth/Throat:      Mouth: Mucous membranes are moist.      Pharynx: Oropharynx is clear. Uvula midline.   Eyes:      Conjunctiva/sclera: Conjunctivae normal.   Neck:      Musculoskeletal: Neck supple.      Thyroid: No thyroid mass or thyromegaly.   Cardiovascular:      Rate and Rhythm: Regular rhythm.      Pulses: Normal pulses.      Heart sounds: S1 normal and S2 normal. No murmur. No friction rub. No gallop.    Pulmonary:      Effort: Pulmonary effort is normal.      Breath sounds: Normal breath sounds. No wheezing, rhonchi or rales.   Lymphadenopathy:      Cervical: No cervical adenopathy.   Neurological:      Mental Status: He is alert and oriented to person, place, and time.   Psychiatric:         Attention and Perception: He is attentive.         Speech: Speech normal.         Behavior: Behavior normal.         Thought Content: Thought content normal.         Recent Results (from the past 672 hour(s))   Comprehensive Metabolic Panel    Collection Time: 10/14/20  6:43 AM    Specimen: Blood   Result Value Ref Range    Glucose 127 (H) 65 - 99 mg/dL    BUN 13 8 - 23 mg/dL    Creatinine 0.93 0.76 - 1.27 mg/dL    Sodium 140 136 - 145 mmol/L    Potassium 4.1 3.5 - 5.2 mmol/L    Chloride 101 98 - 107 mmol/L    CO2 26.7 22.0 - 29.0 mmol/L    Calcium 9.8 8.6 - 10.5 mg/dL    Total Protein 7.9 6.0 - 8.5 g/dL    Albumin 4.40 3.50 - 5.20 g/dL    ALT (SGPT) 25 1 - 41 U/L    AST (SGOT) 29 1 - 40 U/L    Alkaline Phosphatase 87 39 - " 117 U/L    Total Bilirubin 1.1 0.0 - 1.2 mg/dL    eGFR Non African Amer 82 >60 mL/min/1.73    Globulin 3.5 gm/dL    A/G Ratio 1.3 g/dL    BUN/Creatinine Ratio 14.0 7.0 - 25.0    Anion Gap 12.3 5.0 - 15.0 mmol/L   Troponin    Collection Time: 10/14/20  6:43 AM    Specimen: Blood   Result Value Ref Range    Troponin T <0.010 0.000 - 0.030 ng/mL   CBC Auto Differential    Collection Time: 10/14/20  6:43 AM    Specimen: Blood   Result Value Ref Range    WBC 7.57 3.40 - 10.80 10*3/mm3    RBC 5.38 4.14 - 5.80 10*6/mm3    Hemoglobin 17.2 13.0 - 17.7 g/dL    Hematocrit 52.2 (H) 37.5 - 51.0 %    MCV 97.0 79.0 - 97.0 fL    MCH 32.0 26.6 - 33.0 pg    MCHC 33.0 31.5 - 35.7 g/dL    RDW 12.0 (L) 12.3 - 15.4 %    RDW-SD 43.7 37.0 - 54.0 fl    MPV 8.7 6.0 - 12.0 fL    Platelets 249 140 - 450 10*3/mm3    Neutrophil % 63.7 42.7 - 76.0 %    Lymphocyte % 24.8 19.6 - 45.3 %    Monocyte % 9.0 5.0 - 12.0 %    Eosinophil % 1.5 0.3 - 6.2 %    Basophil % 0.9 0.0 - 1.5 %    Immature Grans % 0.1 0.0 - 0.5 %    Neutrophils, Absolute 4.82 1.70 - 7.00 10*3/mm3    Lymphocytes, Absolute 1.88 0.70 - 3.10 10*3/mm3    Monocytes, Absolute 0.68 0.10 - 0.90 10*3/mm3    Eosinophils, Absolute 0.11 0.00 - 0.40 10*3/mm3    Basophils, Absolute 0.07 0.00 - 0.20 10*3/mm3    Immature Grans, Absolute 0.01 0.00 - 0.05 10*3/mm3   Lipase    Collection Time: 10/14/20  6:43 AM    Specimen: Blood   Result Value Ref Range    Lipase 33 13 - 60 U/L         Assessment/Plan   Diagnoses and all orders for this visit:    1. Heartburn (Primary)    2. Need for influenza vaccination  -     Fluad Quad >65 years    3. Need for pneumococcal vaccination  -     Pneumococcal Polysaccharide Vaccine 23-Valent Greater Than or Equal To 3yo Subcutaneous / IM      Patient counseled regarding therapeutic lifestyle changes including improved nutrition, increased exercise, and weight loss.          The plan of care was discussed. All questions were answered. Patient verbalized  understanding.      Return - next available, for Medicare Wellness.

## 2020-11-07 ENCOUNTER — RESULTS ENCOUNTER (OUTPATIENT)
Dept: INTERNAL MEDICINE | Facility: CLINIC | Age: 65
End: 2020-11-07

## 2020-11-07 DIAGNOSIS — Z12.5 ENCOUNTER FOR SCREENING FOR MALIGNANT NEOPLASM OF PROSTATE: ICD-10-CM

## 2020-11-07 DIAGNOSIS — Z00.00 ROUTINE HEALTH MAINTENANCE: ICD-10-CM

## 2021-02-25 ENCOUNTER — OFFICE VISIT (OUTPATIENT)
Dept: INTERNAL MEDICINE | Facility: CLINIC | Age: 66
End: 2021-02-25

## 2021-02-25 VITALS
HEIGHT: 77 IN | SYSTOLIC BLOOD PRESSURE: 130 MMHG | DIASTOLIC BLOOD PRESSURE: 82 MMHG | TEMPERATURE: 97.1 F | HEART RATE: 89 BPM | OXYGEN SATURATION: 98 % | WEIGHT: 224.8 LBS | RESPIRATION RATE: 16 BRPM | BODY MASS INDEX: 26.54 KG/M2

## 2021-02-25 DIAGNOSIS — G89.29 CHRONIC MIDLINE LOW BACK PAIN WITH BILATERAL SCIATICA: Primary | ICD-10-CM

## 2021-02-25 DIAGNOSIS — M54.42 CHRONIC MIDLINE LOW BACK PAIN WITH BILATERAL SCIATICA: Primary | ICD-10-CM

## 2021-02-25 DIAGNOSIS — K21.9 GASTROESOPHAGEAL REFLUX DISEASE, UNSPECIFIED WHETHER ESOPHAGITIS PRESENT: ICD-10-CM

## 2021-02-25 DIAGNOSIS — M54.41 CHRONIC MIDLINE LOW BACK PAIN WITH BILATERAL SCIATICA: Primary | ICD-10-CM

## 2021-02-25 PROCEDURE — 99213 OFFICE O/P EST LOW 20 MIN: CPT | Performed by: NURSE PRACTITIONER

## 2021-02-25 RX ORDER — PREDNISONE 20 MG/1
40 TABLET ORAL DAILY
Qty: 10 TABLET | Refills: 0 | Status: SHIPPED | OUTPATIENT
Start: 2021-02-25 | End: 2021-03-02

## 2021-02-25 RX ORDER — OMEPRAZOLE 40 MG/1
40 CAPSULE, DELAYED RELEASE ORAL DAILY
Qty: 14 CAPSULE | Refills: 0 | Status: SHIPPED | OUTPATIENT
Start: 2021-02-25 | End: 2021-03-11

## 2021-02-25 NOTE — PROGRESS NOTES
"Juventino Johnson is a 65 y.o. male presenting today for   Chief Complaint   Patient presents with   • Back Pain       Subjective    History of Present Illness     \"I have had back problems since my 30s. It is getting worse and I have never had it looked at and I need to find out what is going on.\" He has intermittently had chiropractic evaluation over the years and sts he was told he had \"bone spurs.\" Pain is in the lumbar region. Radiates equally to both legs. Stabbing. It has been exacerbated over the past and all of his normal home remedies have been unsuccessful to provide relief. This exacerbation began after a slip and fall on the ice. No loss of bowel or bladder function. Denies LE weakness/numbness. There is a bit on tingling.    He has prior h/o intermittent heartburn.      The following portions of the patient's history were reviewed and updated as appropriate: allergies, current medications, problem list, past medical history, past surgical history, family history, and social history.    Review of Systems   Gastrointestinal: Positive for GERD.   Musculoskeletal: Positive for back pain.         Objective    Vitals:    02/25/21 0936   BP: 130/82   BP Location: Left arm   Patient Position: Standing   Cuff Size: Adult   Pulse: 89   Resp: 16   Temp: 97.1 °F (36.2 °C)   TempSrc: Temporal   SpO2: 98%   Weight: 102 kg (224 lb 12.8 oz)   Height: 195.6 cm (77.01\")     Body mass index is 26.65 kg/m².  Nursing notes and vitals reviewed.    Physical Exam  Constitutional:       General: He is not in acute distress.     Appearance: He is well-developed.   Pulmonary:      Effort: Pulmonary effort is normal. No respiratory distress.   Musculoskeletal:      Lumbar back: He exhibits decreased range of motion. He exhibits no bony tenderness and no deformity.   Neurological:      Mental Status: He is alert and oriented to person, place, and time.      Sensory: Sensation is intact.      Motor: Motor function is intact.      Gait: " Gait is intact.      Deep Tendon Reflexes:      Reflex Scores:       Patellar reflexes are 2+ on the right side and 2+ on the left side.  Psychiatric:         Speech: Speech normal.         Thought Content: Thought content normal.           Assessment and Plan    Diagnoses and all orders for this visit:    1. Chronic midline low back pain with bilateral sciatica (Primary)  -     XR Spine Lumbar 4+ View  -     predniSONE (DELTASONE) 20 MG tablet; Take 2 tablets by mouth Daily for 5 days.  Dispense: 10 tablet; Refill: 0    2. Gastroesophageal reflux disease, unspecified whether esophagitis present  -     omeprazole (priLOSEC) 40 MG capsule; Take 1 capsule by mouth Daily for 14 days.  Dispense: 14 capsule; Refill: 0            Medications, including side effects, were discussed with the patient. Patient verbalized understanding.  The plan of care was discussed. All questions were answered. Patient verbalized understanding.        Return in about 2 weeks (around 3/11/2021) for Medicare Wellness.

## 2021-02-26 LAB
ALBUMIN SERPL-MCNC: 4.5 G/DL (ref 3.5–5.2)
ALBUMIN/GLOB SERPL: 1.7 G/DL
ALP SERPL-CCNC: 85 U/L (ref 39–117)
ALT SERPL-CCNC: 28 U/L (ref 1–41)
AST SERPL-CCNC: 34 U/L (ref 1–40)
BASOPHILS # BLD AUTO: 0.09 10*3/MM3 (ref 0–0.2)
BASOPHILS NFR BLD AUTO: 1.5 % (ref 0–1.5)
BILIRUB SERPL-MCNC: 0.8 MG/DL (ref 0–1.2)
BUN SERPL-MCNC: 13 MG/DL (ref 8–23)
BUN/CREAT SERPL: 16.3 (ref 7–25)
CALCIUM SERPL-MCNC: 9.6 MG/DL (ref 8.6–10.5)
CHLORIDE SERPL-SCNC: 101 MMOL/L (ref 98–107)
CHOLEST SERPL-MCNC: 235 MG/DL (ref 0–200)
CHOLEST/HDLC SERPL: 3.98 {RATIO}
CO2 SERPL-SCNC: 24.4 MMOL/L (ref 22–29)
CREAT SERPL-MCNC: 0.8 MG/DL (ref 0.76–1.27)
EOSINOPHIL # BLD AUTO: 0.1 10*3/MM3 (ref 0–0.4)
EOSINOPHIL NFR BLD AUTO: 1.7 % (ref 0.3–6.2)
ERYTHROCYTE [DISTWIDTH] IN BLOOD BY AUTOMATED COUNT: 12.5 % (ref 12.3–15.4)
GLOBULIN SER CALC-MCNC: 2.6 GM/DL
GLUCOSE SERPL-MCNC: 113 MG/DL (ref 65–99)
HCT VFR BLD AUTO: 49.3 % (ref 37.5–51)
HDLC SERPL-MCNC: 59 MG/DL (ref 40–60)
HGB BLD-MCNC: 17.2 G/DL (ref 13–17.7)
IMM GRANULOCYTES # BLD AUTO: 0.01 10*3/MM3 (ref 0–0.05)
IMM GRANULOCYTES NFR BLD AUTO: 0.2 % (ref 0–0.5)
LDLC SERPL CALC-MCNC: 137 MG/DL (ref 0–100)
LYMPHOCYTES # BLD AUTO: 1.66 10*3/MM3 (ref 0.7–3.1)
LYMPHOCYTES NFR BLD AUTO: 27.8 % (ref 19.6–45.3)
MCH RBC QN AUTO: 32.7 PG (ref 26.6–33)
MCHC RBC AUTO-ENTMCNC: 34.9 G/DL (ref 31.5–35.7)
MCV RBC AUTO: 93.7 FL (ref 79–97)
MONOCYTES # BLD AUTO: 0.6 10*3/MM3 (ref 0.1–0.9)
MONOCYTES NFR BLD AUTO: 10.1 % (ref 5–12)
NEUTROPHILS # BLD AUTO: 3.51 10*3/MM3 (ref 1.7–7)
NEUTROPHILS NFR BLD AUTO: 58.7 % (ref 42.7–76)
NRBC BLD AUTO-RTO: 0 /100 WBC (ref 0–0.2)
PLATELET # BLD AUTO: 252 10*3/MM3 (ref 140–450)
POTASSIUM SERPL-SCNC: 4.3 MMOL/L (ref 3.5–5.2)
PROT SERPL-MCNC: 7.1 G/DL (ref 6–8.5)
PSA SERPL-MCNC: 1.8 NG/ML (ref 0–4)
RBC # BLD AUTO: 5.26 10*6/MM3 (ref 4.14–5.8)
SODIUM SERPL-SCNC: 138 MMOL/L (ref 136–145)
TRIGL SERPL-MCNC: 222 MG/DL (ref 0–150)
TSH SERPL DL<=0.005 MIU/L-ACNC: 2.74 UIU/ML (ref 0.45–4.5)
VLDLC SERPL CALC-MCNC: 39 MG/DL (ref 5–40)
WBC # BLD AUTO: 5.97 10*3/MM3 (ref 3.4–10.8)

## 2021-02-27 ENCOUNTER — HOSPITAL ENCOUNTER (OUTPATIENT)
Dept: GENERAL RADIOLOGY | Facility: HOSPITAL | Age: 66
Discharge: HOME | End: 2021-02-27
Admitting: NURSE PRACTITIONER

## 2021-02-27 PROCEDURE — 72110 X-RAY EXAM L-2 SPINE 4/>VWS: CPT

## 2021-03-09 ENCOUNTER — TELEPHONE (OUTPATIENT)
Dept: INTERNAL MEDICINE | Facility: CLINIC | Age: 66
End: 2021-03-09

## 2021-03-09 NOTE — TELEPHONE ENCOUNTER
Caller: Juventino Johnson    Relationship to patient: Self    Best call back number: [phone]     Patient is needing: Patient called in and stated he is scheduled for his first covid-19 shot and someone told him that if your on steroids not to take it and wants to know if he is okay to receive his shot tomorrow ?  Please call and advise .

## 2021-03-11 ENCOUNTER — OFFICE VISIT (OUTPATIENT)
Dept: INTERNAL MEDICINE | Facility: CLINIC | Age: 66
End: 2021-03-11

## 2021-03-11 VITALS
SYSTOLIC BLOOD PRESSURE: 120 MMHG | OXYGEN SATURATION: 98 % | TEMPERATURE: 97.5 F | BODY MASS INDEX: 26 KG/M2 | HEART RATE: 96 BPM | DIASTOLIC BLOOD PRESSURE: 80 MMHG | RESPIRATION RATE: 16 BRPM | WEIGHT: 220.2 LBS | HEIGHT: 77 IN

## 2021-03-11 DIAGNOSIS — Z00.00 ENCOUNTER FOR MEDICARE ANNUAL WELLNESS EXAM: Primary | ICD-10-CM

## 2021-03-11 DIAGNOSIS — R73.01 IFG (IMPAIRED FASTING GLUCOSE): Chronic | ICD-10-CM

## 2021-03-11 DIAGNOSIS — E78.2 MIXED HYPERLIPIDEMIA: Chronic | ICD-10-CM

## 2021-03-11 DIAGNOSIS — M51.36 DDD (DEGENERATIVE DISC DISEASE), LUMBAR: ICD-10-CM

## 2021-03-11 DIAGNOSIS — R31.21 ASYMPTOMATIC MICROSCOPIC HEMATURIA: ICD-10-CM

## 2021-03-11 PROBLEM — M51.369 DDD (DEGENERATIVE DISC DISEASE), LUMBAR: Status: ACTIVE | Noted: 2021-03-11

## 2021-03-11 LAB
BILIRUB BLD-MCNC: NEGATIVE MG/DL
CLARITY, POC: CLEAR
COLOR UR: YELLOW
GLUCOSE UR STRIP-MCNC: NEGATIVE MG/DL
KETONES UR QL: ABNORMAL
LEUKOCYTE EST, POC: NEGATIVE
NITRITE UR-MCNC: NEGATIVE MG/ML
PH UR: 6.5 [PH] (ref 5–8)
PROT UR STRIP-MCNC: ABNORMAL MG/DL
RBC # UR STRIP: ABNORMAL /UL
SP GR UR: 1.02 (ref 1–1.03)
UROBILINOGEN UR QL: NORMAL

## 2021-03-11 PROCEDURE — G0402 INITIAL PREVENTIVE EXAM: HCPCS | Performed by: NURSE PRACTITIONER

## 2021-03-11 PROCEDURE — 81003 URINALYSIS AUTO W/O SCOPE: CPT | Performed by: NURSE PRACTITIONER

## 2021-03-11 PROCEDURE — 99213 OFFICE O/P EST LOW 20 MIN: CPT | Performed by: NURSE PRACTITIONER

## 2021-03-11 NOTE — PROGRESS NOTES
The ABCs of the Annual Wellness Visit  Subsequent Medicare Wellness Visit    Chief Complaint   Patient presents with   • Medicare Wellness-subsequent       Subjective   History of Present Illness:  Juventino Johnson is a 65 y.o. male who presents for a Subsequent Medicare Wellness Visit.        Patient Active Problem List   Diagnosis   • Mixed hyperlipidemia   • Cyst of knee joint, right   • IFG (impaired fasting glucose)   • Asymptomatic microscopic hematuria   • Diverticulosis of large intestine without hemorrhage   • DDD (degenerative disc disease), lumbar       Outpatient Medications Prior to Visit   Medication Sig Dispense Refill   • omeprazole (priLOSEC) 40 MG capsule Take 1 capsule by mouth Daily for 14 days. 14 capsule 0     No facility-administered medications prior to visit.        Chronic Health Conditions Addressed in this Visit:    His back pain is down to baseline. Although he does not want to take steroids again d/t SEs including nausea.     HEALTH RISK ASSESSMENT    Recent Hospitalizations:  No hospitalization(s) within the last year.    Current Medical Providers:  Patient Care Team:  Nathaly Branham APRN as PCP - General (Family Medicine)    Smoking Status:  Social History     Tobacco Use   Smoking Status Former Smoker   • Packs/day: 1.00   • Years: 15.00   • Pack years: 15.00   Smokeless Tobacco Never Used       Alcohol Consumption:  Social History     Substance and Sexual Activity   Alcohol Use Yes   • Alcohol/week: 12.0 standard drinks   • Types: 12 Glasses of wine per week       Depression Screen:   PHQ-2/PHQ-9 Depression Screening 3/11/2021   Little interest or pleasure in doing things 0   Feeling down, depressed, or hopeless 0   Total Score 0       Fall Risk Screen:  SARINA Fall Risk Assessment was completed, and patient is at MODERATE risk for falls. Assessment completed on:3/11/2021    Health Habits and Functional and Cognitive Screening:  Functional & Cognitive Status 3/11/2021   Do you  have difficulty preparing food and eating? No   Do you have difficulty bathing yourself, getting dressed or grooming yourself? No   Do you have difficulty using the toilet? No   Do you have difficulty moving around from place to place? No   Do you have trouble with steps or getting out of a bed or a chair? No   Current Diet Well Balanced Diet   Dental Exam Not up to date   Eye Exam Not up to date   Exercise (times per week) 5 times per week   Current Exercise Activities Include Walking   Do you need help using the phone?  No   Are you deaf or do you have serious difficulty hearing?  No   Do you need help with transportation? No   Do you need help shopping? No   Do you need help preparing meals?  No   Do you need help with housework?  No   Do you need help with laundry? No   Do you need help taking your medications? No   Do you need help managing money? No   Do you ever drive or ride in a car without wearing a seat belt? No   Have you felt unusual stress, anger or loneliness in the last month? No   Who do you live with? Spouse   If you need help, do you have trouble finding someone available to you? No   Have you been bothered in the last four weeks by sexual problems? No   Do you have difficulty concentrating, remembering or making decisions? No         Does the patient have evidence of cognitive impairment? No    Asprin use counseling:Does not need ASA (and currently is not on it)    Age-appropriate Screening Schedule:  Refer to the list below for future screening recommendations based on patient's age, sex and/or medical conditions. Orders for these recommended tests are listed in the plan section. The patient has been provided with a written plan.    Health Maintenance   Topic Date Due   • TDAP/TD VACCINES (1 - Tdap) Never done   • ZOSTER VACCINE (1 of 2) Never done   • LIPID PANEL  02/25/2022   • COLONOSCOPY  09/01/2028   • INFLUENZA VACCINE  Completed          The following portions of the patient's history  "were reviewed and updated as appropriate: allergies, current medications, past family history, past medical history, past social history, past surgical history, and problem list.      Compared to one year ago, the patient feels his physical health is better.  Compared to one year ago, the patient feels his mental health is the same.    Reviewed chart for potential of high risk medication in the elderly: yes  Reviewed chart for potential of harmful drug interactions in the elderly:yes      Review of Systems   Constitutional: Negative.    HENT: Negative.    Eyes: Negative.    Respiratory: Negative.    Cardiovascular: Negative.    Gastrointestinal: Negative.    Endocrine: Negative.    Genitourinary: Negative.    Musculoskeletal: Positive for back pain.   Skin: Negative.    Allergic/Immunologic: Negative.    Neurological: Negative.    Hematological: Negative.    Psychiatric/Behavioral: Negative.          Objective         Vitals:    03/11/21 1004   BP: 120/80   BP Location: Left arm   Patient Position: Sitting   Cuff Size: Large Adult   Pulse: 96   Resp: 16   Temp: 97.5 °F (36.4 °C)   TempSrc: Temporal   SpO2: 98%   Weight: 99.9 kg (220 lb 3.2 oz)   Height: 195.6 cm (77.01\")       Body mass index is 26.11 kg/m².  Discussed the patient's BMI with him. The BMI is above average; BMI management plan is completed.  Patient's Body mass index is 26.11 kg/m². BMI is above normal parameters. Recommendations include: exercise counseling and nutrition counseling.        Physical Exam    Recent Results (from the past 672 hour(s))   CBC & Differential    Collection Time: 02/25/21 10:19 AM    Specimen: Blood   Result Value Ref Range    WBC 5.97 3.40 - 10.80 10*3/mm3    RBC 5.26 4.14 - 5.80 10*6/mm3    Hemoglobin 17.2 13.0 - 17.7 g/dL    Hematocrit 49.3 37.5 - 51.0 %    MCV 93.7 79.0 - 97.0 fL    MCH 32.7 26.6 - 33.0 pg    MCHC 34.9 31.5 - 35.7 g/dL    RDW 12.5 12.3 - 15.4 %    Platelets 252 140 - 450 10*3/mm3    Neutrophil Rel % 58.7 " 42.7 - 76.0 %    Lymphocyte Rel % 27.8 19.6 - 45.3 %    Monocyte Rel % 10.1 5.0 - 12.0 %    Eosinophil Rel % 1.7 0.3 - 6.2 %    Basophil Rel % 1.5 0.0 - 1.5 %    Neutrophils Absolute 3.51 1.70 - 7.00 10*3/mm3    Lymphocytes Absolute 1.66 0.70 - 3.10 10*3/mm3    Monocytes Absolute 0.60 0.10 - 0.90 10*3/mm3    Eosinophils Absolute 0.10 0.00 - 0.40 10*3/mm3    Basophils Absolute 0.09 0.00 - 0.20 10*3/mm3    Immature Granulocyte Rel % 0.2 0.0 - 0.5 %    Immature Grans Absolute 0.01 0.00 - 0.05 10*3/mm3    nRBC 0.0 0.0 - 0.2 /100 WBC   Comprehensive Metabolic Panel    Collection Time: 02/25/21 10:19 AM    Specimen: Blood   Result Value Ref Range    Glucose 113 (H) 65 - 99 mg/dL    BUN 13 8 - 23 mg/dL    Creatinine 0.80 0.76 - 1.27 mg/dL    eGFR Non African Am 97 >60 mL/min/1.73    eGFR African Am 118 >60 mL/min/1.73    BUN/Creatinine Ratio 16.3 7.0 - 25.0    Sodium 138 136 - 145 mmol/L    Potassium 4.3 3.5 - 5.2 mmol/L    Chloride 101 98 - 107 mmol/L    Total CO2 24.4 22.0 - 29.0 mmol/L    Calcium 9.6 8.6 - 10.5 mg/dL    Total Protein 7.1 6.0 - 8.5 g/dL    Albumin 4.50 3.50 - 5.20 g/dL    Globulin 2.6 gm/dL    A/G Ratio 1.7 g/dL    Total Bilirubin 0.8 0.0 - 1.2 mg/dL    Alkaline Phosphatase 85 39 - 117 U/L    AST (SGOT) 34 1 - 40 U/L    ALT (SGPT) 28 1 - 41 U/L   Lipid Panel With / Chol / HDL Ratio    Collection Time: 02/25/21 10:19 AM    Specimen: Blood   Result Value Ref Range    Total Cholesterol 235 (H) 0 - 200 mg/dL    Triglycerides 222 (H) 0 - 150 mg/dL    HDL Cholesterol 59 40 - 60 mg/dL    VLDL Cholesterol Armin 39 5 - 40 mg/dL    LDL Chol Calc (NIH) 137 (H) 0 - 100 mg/dL    Chol/HDL Ratio 3.98    Thyroid [place] Profile    Collection Time: 02/25/21 10:19 AM    Specimen: Blood   Result Value Ref Range    TSH 2.740 0.450 - 4.500 uIU/mL   PSA Screen    Collection Time: 02/25/21 10:19 AM    Specimen: Blood   Result Value Ref Range    PSA 1.800 0.000 - 4.000 ng/mL   POC Urinalysis Dipstick, Automated    Collection  Time: 03/11/21 10:58 AM    Specimen: Urine   Result Value Ref Range    Color Yellow Yellow, Straw, Dark Yellow, Leandra    Clarity, UA Clear Clear    Specific Gravity  1.025 1.005 - 1.030    pH, Urine 6.5 5.0 - 8.0    Leukocytes Negative Negative    Nitrite, UA Negative Negative    Protein, POC 30 mg/dL (A) Negative mg/dL    Glucose, UA Negative Negative, 1000 mg/dL (3+) mg/dL    Ketones, UA Trace (A) Negative    Urobilinogen, UA Normal Normal    Bilirubin Negative Negative    Blood, UA Moderate (A) Negative         Each of these lab results were discussed individually in detail with the patient.      Assessment/Plan     Diagnoses and all orders for this visit:    1. Encounter for Medicare annual wellness exam (Primary)    2. DDD (degenerative disc disease), lumbar  -     Ambulatory Referral to Physical Therapy Evaluate and treat    3. Mixed hyperlipidemia  Comments:  - pt is opposed to medication  - I advised him to focus on TLCs and add Omega 3 BID     4. IFG (impaired fasting glucose)  Comments:  - focus on TLCs    5. Asymptomatic microscopic hematuria  -     POC Urinalysis Dipstick, Automated        Medicare Risks and Personalized Health Plan    Advanced Care Planning:  ACP discussion was held with the patient during this visit. Pt is unsure if he has an advacned directive. Information provided.    CMS Preventative Services Quick Reference  Advance Directive Discussion  Colon Cancer Screening  Diabetic Lab Screening   Immunizations Discussed/Encouraged (specific immunizations; adacel Tdap, Shingrix and COVID-19 )  Prostate Cancer Screening         The above risks/problems have been discussed with the patient.  Pertinent information has been shared with the patient in the After Visit Summary.  Follow up plans and orders are seen below in the Assessment/Plan Section.      Follow Up:  Return in about 1 year (around 3/11/2022) for Medicare Wellness.     An After Visit Summary and PPPS were given to the patient.

## 2021-03-11 NOTE — PATIENT INSTRUCTIONS
Add Omega 3 supplement to your daily routine. Dot Maximum Omega is a good choice and available for purchase on [employer].      Advance Care Planning and Advance Directives     You make decisions on a daily basis - decisions about where you want to live, your career, your home, your life. Perhaps one of the most important decisions you face is your choice for future medical care. Take time to talk with your family and your healthcare team and start planning today.  Advance Care Planning is a process that can help you:  · Understand possible future healthcare decisions in light of your own experiences  · Reflect on those decision in light of your goals and values  · Discuss your decisions with those closest to you and the healthcare professionals that care for you  · Make a plan by creating a document that reflects your wishes    Surrogate Decision Maker  In the event of a medical emergency, which has left you unable to communicate or to make your own decisions, you would need someone to make decisions for you.  It is important to discuss your preferences for medical treatment with this person while you are in good health.     Qualities of a surrogate decision maker:  • Willing to take on this role and responsibility  • Knows what you want for future medical care  • Willing to follow your wishes even if they don't agree with them  • Able to make difficult medical decisions under stressful circumstances    Advance Directives  These are legal documents you can create that will guide your healthcare team and decision maker(s) when needed. These documents can be stored in the electronic medical record.    · Living Will - a legal document to guide your care if you have a terminal condition or a serious illness and are unable to communicate. States vary by statute in document names/types, but most forms may include one or more of the following:        -  Directions regarding life-prolonging treatments        -   Directions regarding artificially provided nutrition/hydration        -  Choosing a healthcare decision maker        -  Direction regarding organ/tissue donation    · Durable Power of  for Healthcare - this document names an -in-fact to make medical decisions for you, but it may also allow this person to make personal and financial decisions for you. Please seek the advice of an  if you need this type of document.    **Advance Directives are not required and no one may discriminate against you if you do not sign one.    Medical Orders  Many states allow specific forms/orders signed by your physician to record your wishes for medical treatment in your current state of health. This form, signed in personal communication with your physician, addresses resuscitation and other medical interventions that you may or may not want.      For more information or to schedule a time with a [facility] Advance Care Planning Facilitator contact: [email or web address] or call [phone] and someone will contact you directly.

## 2021-08-27 ENCOUNTER — HOSPITAL ENCOUNTER (OUTPATIENT)
Dept: GENERAL RADIOLOGY | Facility: HOSPITAL | Age: 66
Discharge: HOME | End: 2021-08-27
Admitting: NURSE PRACTITIONER

## 2021-08-27 ENCOUNTER — OFFICE VISIT (OUTPATIENT)
Dept: INTERNAL MEDICINE | Facility: CLINIC | Age: 66
End: 2021-08-27

## 2021-08-27 VITALS
HEART RATE: 82 BPM | SYSTOLIC BLOOD PRESSURE: 130 MMHG | DIASTOLIC BLOOD PRESSURE: 82 MMHG | OXYGEN SATURATION: 97 % | BODY MASS INDEX: 24.94 KG/M2 | WEIGHT: 211.2 LBS | HEIGHT: 77 IN | TEMPERATURE: 97 F

## 2021-08-27 DIAGNOSIS — R10.9 ABDOMINAL PAIN, UNSPECIFIED ABDOMINAL LOCATION: Primary | ICD-10-CM

## 2021-08-27 DIAGNOSIS — R31.29 OTHER MICROSCOPIC HEMATURIA: ICD-10-CM

## 2021-08-27 LAB
BILIRUB BLD-MCNC: NEGATIVE MG/DL
COLOR UR: YELLOW
GLUCOSE UR STRIP-MCNC: NEGATIVE MG/DL
KETONES UR QL: NEGATIVE
LEUKOCYTE EST, POC: NEGATIVE
NITRITE UR-MCNC: NEGATIVE MG/ML
PH UR: 6.5 [PH] (ref 5–8)
PROT UR STRIP-MCNC: NEGATIVE MG/DL
RBC # UR STRIP: ABNORMAL /UL
SP GR UR: 1.02 (ref 1–1.03)
UROBILINOGEN UR QL: NORMAL

## 2021-08-27 PROCEDURE — 81002 URINALYSIS NONAUTO W/O SCOPE: CPT | Performed by: NURSE PRACTITIONER

## 2021-08-27 PROCEDURE — 99213 OFFICE O/P EST LOW 20 MIN: CPT | Performed by: NURSE PRACTITIONER

## 2021-08-27 PROCEDURE — 74018 RADEX ABDOMEN 1 VIEW: CPT

## 2021-08-27 NOTE — PROGRESS NOTES
"Juventino Johnson is a 66 y.o. male presenting today for   Chief Complaint   Patient presents with   • Fever   • Abdominal Pain       Subjective    History of Present Illness     Pt presents c/o fever and abd pain. This has been intermittent over the past month. Tmax was 99.? +chills. Pain is located in the pelvic region. No OTCs. Self-resolves over a 24 hour period. Denies dysuria, frequency. + Urgency. This pain seems to prompt a sensation of urge to void. Denies hematuria. +nausea. Denies v/d/c. Denies pain w/ defecation.    The following portions of the patient's history were reviewed and updated as appropriate: allergies, current medications, problem list, past medical history, past surgical history, family history, and social history.        Objective    Vitals:    08/27/21 1054   BP: 130/82   Pulse: 82   Temp: 97 °F (36.1 °C)   SpO2: 97%   Weight: 95.8 kg (211 lb 3.2 oz)   Height: 195.6 cm (77.01\")     Body mass index is 25.04 kg/m².  Nursing notes and vitals reviewed.    Physical Exam  Constitutional:       General: He is not in acute distress.     Appearance: He is well-developed.   Cardiovascular:      Rate and Rhythm: Regular rhythm.      Heart sounds: Normal heart sounds.   Pulmonary:      Effort: Pulmonary effort is normal.      Breath sounds: Normal breath sounds.   Abdominal:      General: Abdomen is flat. Bowel sounds are normal.      Palpations: Abdomen is soft.      Tenderness: There is abdominal tenderness in the suprapubic area.   Neurological:      Mental Status: He is alert and oriented to person, place, and time.   Psychiatric:         Attention and Perception: He is attentive.         Mood and Affect: Mood and affect normal.         Speech: Speech normal.         Behavior: Behavior normal.         Thought Content: Thought content normal.         CT Abdomen Pelvis With & Without Contrast (11/13/2019 11:54)  PSA Screen (02/25/2021 10:19)    Recent Results (from the past 24 hour(s))   POCT urinalysis " dipstick, manual    Collection Time: 08/27/21 11:04 AM    Specimen: Urine   Result Value Ref Range    Color Yellow Yellow, Straw, Dark Yellow, Leandra    Glucose, UA Negative Negative, 1000 mg/dL (3+) mg/dL    Bilirubin Negative Negative    Ketones, UA Negative Negative    Specific Gravity  1.025 1.005 - 1.030    Blood, UA Moderate (A) Negative    pH, Urine 6.5 5.0 - 8.0    Protein, POC Negative Negative mg/dL    Urobilinogen, UA Normal Normal    Leukocytes Negative Negative    Nitrite, UA Negative Negative       Assessment and Plan    Diagnoses and all orders for this visit:    1. Abdominal pain, unspecified abdominal location (Primary)  -     POCT urinalysis dipstick, manual  -     XR Abdomen KUB  -     Urine Culture - , Urine, Clean Catch    2. Other microscopic hematuria  -     XR Abdomen KUB  -     Urine Culture - , Urine, Clean Catch  -     Ambulatory Referral to Urology            Medications, including side effects, were discussed with the patient. Patient verbalized understanding.  The plan of care was discussed. All questions were answered. Patient verbalized understanding.        Return if symptoms worsen or fail to improve.

## 2021-08-29 LAB
BACTERIA UR CULT: NORMAL
BACTERIA UR CULT: NORMAL

## 2021-10-15 ENCOUNTER — TRANSCRIBE ORDERS (OUTPATIENT)
Dept: ADMINISTRATIVE | Facility: HOSPITAL | Age: 66
End: 2021-10-15

## 2021-10-15 DIAGNOSIS — M54.50 LOW BACK PAIN, UNSPECIFIED BACK PAIN LATERALITY, UNSPECIFIED CHRONICITY, UNSPECIFIED WHETHER SCIATICA PRESENT: Primary | ICD-10-CM

## 2021-10-27 ENCOUNTER — HOSPITAL ENCOUNTER (OUTPATIENT)
Dept: CT IMAGING | Facility: HOSPITAL | Age: 66
Discharge: HOME | End: 2021-10-27
Admitting: UROLOGY

## 2021-10-27 DIAGNOSIS — M54.50 LOW BACK PAIN, UNSPECIFIED BACK PAIN LATERALITY, UNSPECIFIED CHRONICITY, UNSPECIFIED WHETHER SCIATICA PRESENT: ICD-10-CM

## 2021-10-27 PROCEDURE — 74176 CT ABD & PELVIS W/O CONTRAST: CPT

## 2021-11-04 ENCOUNTER — CLINICAL SUPPORT (OUTPATIENT)
Dept: INTERNAL MEDICINE | Facility: CLINIC | Age: 66
End: 2021-11-04

## 2021-11-04 DIAGNOSIS — Z23 ENCOUNTER FOR IMMUNIZATION: Primary | ICD-10-CM

## 2021-11-04 PROCEDURE — G0008 ADMIN INFLUENZA VIRUS VAC: HCPCS | Performed by: NURSE PRACTITIONER

## 2021-11-04 PROCEDURE — 90662 IIV NO PRSV INCREASED AG IM: CPT | Performed by: NURSE PRACTITIONER

## 2021-11-04 NOTE — PROGRESS NOTES
Injection  Injection performed by Alma Bond MA. Patient tolerated the procedure well without complications.  11/04/21   Alma Bond MA

## 2022-01-17 ENCOUNTER — TELEPHONE (OUTPATIENT)
Dept: INTERNAL MEDICINE | Facility: CLINIC | Age: 67
End: 2022-01-17

## 2022-01-17 NOTE — TELEPHONE ENCOUNTER
Called and spoke with pt and advised him that there was no apts opened today and pt [facility]

## 2022-01-17 NOTE — TELEPHONE ENCOUNTER
Caller: Juventino Johnson    Relationship: Self    Best call back number: [phone]      What medication are you requesting: ANTIBIOTIC    What are your current symptoms: ACCESSED TOOTH    How long have you been experiencing symptoms: SINCE YESTERDAY.        If a prescription is needed, what is your preferred pharmacy and phone number: [facility] - [place], [place] - [address] - [phone]  - [phone]      Additional notes:  PATIENTS TOOTH STARTED HURTING YESTERDAY AND NOW IS PRETTY SWOLLEN CAN NOT GET INTO DENTIST UNTIL TOMORROW AND HE IS ASKING TO GET AN ANTIBIOTIC SENT IN TILL HIS APPT TOMORROW TO HELP WITH THE INFECTION.

## 2022-06-27 ENCOUNTER — TELEPHONE (OUTPATIENT)
Dept: GASTROENTEROLOGY | Facility: CLINIC | Age: 67
End: 2022-06-27

## 2022-06-27 NOTE — TELEPHONE ENCOUNTER
Wife called to schedule his colonoscopy.  Explained about the [facility].  Verify address.  Mailed packet today.

## 2022-07-13 ENCOUNTER — TELEPHONE (OUTPATIENT)
Dept: INTERNAL MEDICINE | Facility: CLINIC | Age: 67
End: 2022-07-13

## 2022-07-21 ENCOUNTER — TELEPHONE (OUTPATIENT)
Dept: GASTROENTEROLOGY | Facility: CLINIC | Age: 67
End: 2022-07-21

## 2022-07-21 ENCOUNTER — PREP FOR SURGERY (OUTPATIENT)
Dept: SURGERY | Facility: SURGERY CENTER | Age: 67
End: 2022-07-21

## 2022-07-21 DIAGNOSIS — K62.5 RECTAL BLEEDING: ICD-10-CM

## 2022-07-21 DIAGNOSIS — Z86.010 PERSONAL HISTORY OF COLONIC POLYPS: Primary | ICD-10-CM

## 2022-07-21 DIAGNOSIS — Z80.0 FAMILY HISTORY OF COLON CANCER: ICD-10-CM

## 2022-07-21 NOTE — TELEPHONE ENCOUNTER
FAST TRACK - RECALL 3 YEARS 09/2021  LAST COLONOSCOPY 09/14/2018 BY DR. ERIC  PERSONAL HISTORY POLYPS   FAMILY HISTORY CRC, SISTER AND GRANDMOTHER  SCHEDULE AT [facility]    DID [place]:  RECTAL BLEEDING(awhile sometimes) AND HEARTBURN (sometimes.

## 2022-07-25 PROBLEM — Z86.010 PERSONAL HISTORY OF COLONIC POLYPS: Status: ACTIVE | Noted: 2022-07-25

## 2022-07-25 PROBLEM — Z86.0100 PERSONAL HISTORY OF COLONIC POLYPS: Status: ACTIVE | Noted: 2022-07-25

## 2022-07-25 PROBLEM — K62.5 RECTAL BLEEDING: Status: ACTIVE | Noted: 2022-07-25

## 2022-07-25 PROBLEM — Z80.0 FAMILY HISTORY OF COLON CANCER: Status: ACTIVE | Noted: 2022-07-25

## 2022-07-25 NOTE — TELEPHONE ENCOUNTER
Return call from patient.  Scheduled at [place] 11/23/2022 at 10:15am - arrive 9am.  Will mail instructions.

## 2022-07-28 ENCOUNTER — OFFICE VISIT (OUTPATIENT)
Dept: INTERNAL MEDICINE | Facility: CLINIC | Age: 67
End: 2022-07-28

## 2022-07-28 VITALS
SYSTOLIC BLOOD PRESSURE: 126 MMHG | WEIGHT: 209.2 LBS | HEIGHT: 77 IN | HEART RATE: 88 BPM | OXYGEN SATURATION: 97 % | TEMPERATURE: 97.3 F | DIASTOLIC BLOOD PRESSURE: 88 MMHG | BODY MASS INDEX: 24.7 KG/M2

## 2022-07-28 DIAGNOSIS — G89.29 CHRONIC MIDLINE LOW BACK PAIN WITH BILATERAL SCIATICA: ICD-10-CM

## 2022-07-28 DIAGNOSIS — M54.42 CHRONIC MIDLINE LOW BACK PAIN WITH BILATERAL SCIATICA: ICD-10-CM

## 2022-07-28 DIAGNOSIS — M79.89 MASS OF SOFT TISSUE OF THIGH: ICD-10-CM

## 2022-07-28 DIAGNOSIS — M54.41 CHRONIC MIDLINE LOW BACK PAIN WITH BILATERAL SCIATICA: ICD-10-CM

## 2022-07-28 DIAGNOSIS — Z12.5 ENCOUNTER FOR SCREENING FOR MALIGNANT NEOPLASM OF PROSTATE: ICD-10-CM

## 2022-07-28 DIAGNOSIS — Z00.00 ENCOUNTER FOR MEDICARE ANNUAL WELLNESS EXAM: Primary | ICD-10-CM

## 2022-07-28 PROCEDURE — 1160F RVW MEDS BY RX/DR IN RCRD: CPT | Performed by: NURSE PRACTITIONER

## 2022-07-28 PROCEDURE — G0439 PPPS, SUBSEQ VISIT: HCPCS | Performed by: NURSE PRACTITIONER

## 2022-07-28 PROCEDURE — 1170F FXNL STATUS ASSESSED: CPT | Performed by: NURSE PRACTITIONER

## 2022-07-28 NOTE — PROGRESS NOTES
The ABCs of the Annual Wellness Visit  Subsequent Medicare Wellness Visit    Chief Complaint   Patient presents with   • Annual Exam     Medicare Wellness       Subjective   History of Present Illness:  Juventino Johnson is a 67 y.o. male who presents for a Subsequent Medicare Wellness Visit as well as follow up of his chronic health conditions.        Patient Active Problem List   Diagnosis   • Mixed hyperlipidemia   • Cyst of knee joint, right   • IFG (impaired fasting glucose)   • Asymptomatic microscopic hematuria   • Diverticulosis of large intestine without hemorrhage   • DDD (degenerative disc disease), lumbar   • Personal history of colonic polyps   • Rectal bleeding   • Family history of colon cancer       No outpatient medications have been marked as taking for the 22 encounter (Office Visit) with Nathaly Branham APRN.       Chronic Health Conditions Addressed in this Visit:  He has a chronic but now enlarging cyst affecting the R thigh. He would like to have this removed.    His chronic back pain continues to gradually worsen.    HEALTH RISK ASSESSMENT    Recent Hospitalizations:  No hospitalization(s) within the last year.    Current Medical Providers:  Patient Care Team:  Nathaly Branham APRN as PCP - General (Family Medicine)    Smoking Status:  Social History     Tobacco Use   Smoking Status Former Smoker   • Packs/day: 1.00   • Years: 15.00   • Pack years: 15.00   • Quit date:    • Years since quittin.5   Smokeless Tobacco Never Used       Alcohol Consumption:  Social History     Substance and Sexual Activity   Alcohol Use Yes   • Alcohol/week: 12.0 standard drinks   • Types: 12 Glasses of wine per week       Depression Screen:   PHQ-2/PHQ-9 Depression Screening 2022   Retired Total Score -   Little Interest or Pleasure in Doing Things 0-->not at all   Feeling Down, Depressed or Hopeless 0-->not at all   PHQ-9: Brief Depression Severity Measure Score 0       Fall Risk  Screen:  SARINA Fall Risk Assessment was completed, and patient is at LOW risk for falls.Assessment completed on:7/28/2022    Health Habits and Functional and Cognitive Screening:  Functional & Cognitive Status 3/11/2021   Do you have difficulty preparing food and eating? No   Do you have difficulty bathing yourself, getting dressed or grooming yourself? No   Do you have difficulty using the toilet? No   Do you have difficulty moving around from place to place? No   Do you have trouble with steps or getting out of a bed or a chair? No   Current Diet Well Balanced Diet   Dental Exam Not up to date   Eye Exam Not up to date   Exercise (times per week) 5 times per week   Current Exercise Activities Include Walking   Do you need help using the phone?  No   Are you deaf or do you have serious difficulty hearing?  No   Do you need help with transportation? No   Do you need help shopping? No   Do you need help preparing meals?  No   Do you need help with housework?  No   Do you need help with laundry? No   Do you need help taking your medications? No   Do you need help managing money? No   Do you ever drive or ride in a car without wearing a seat belt? No   Have you felt unusual stress, anger or loneliness in the last month? No   Who do you live with? Spouse   If you need help, do you have trouble finding someone available to you? No   Have you been bothered in the last four weeks by sexual problems? No   Do you have difficulty concentrating, remembering or making decisions? No         Does the patient have evidence of cognitive impairment? No    Asprin use counseling:Does not need ASA (and currently is not on it)    Age-appropriate Screening Schedule:  Refer to the list below for future screening recommendations based on patient's age, sex and/or medical conditions. Orders for these recommended tests are listed in the plan section. The patient has been provided with a written plan.    Health Maintenance   Topic Date Due  "  • TDAP/TD VACCINES (1 - Tdap) Never done   • ZOSTER VACCINE (1 of 2) Never done   • LIPID PANEL  02/25/2022   • INFLUENZA VACCINE  10/01/2022          The following portions of the patient's history were reviewed and updated as appropriate: allergies, current medications, past family history, past medical history, past social history, past surgical history, and problem list.      Compared to one year ago, the patient feels his physical health is the same.  Compared to one year ago, the patient feels his mental health is the same.    Reviewed chart for potential of high risk medication in the elderly: Yes  Reviewed chart for potential of harmful drug interactions in the elderly:Yes        Objective         Vitals:    07/28/22 0956   BP: 126/88   BP Location: Left arm   Patient Position: Standing   Pulse: 88   Temp: 97.3 °F (36.3 °C)   SpO2: 97%   Weight: 94.9 kg (209 lb 3.2 oz)   Height: 194.3 cm (76.5\")       Body mass index is 25.13 kg/m².  Discussed the patient's BMI with him. The BMI is above average; BMI management plan is completed.  BMI is >= 25 and <30. (Overweight) The following options were offered after discussion;: exercise counseling/recommendations and nutrition counseling/recommendations        Physical Exam  Constitutional:       General: He is not in acute distress.     Appearance: Normal appearance. He is well-developed.   HENT:      Head: Normocephalic.      Right Ear: Hearing, tympanic membrane, ear canal and external ear normal.      Left Ear: Hearing, tympanic membrane, ear canal and external ear normal.      Nose: Nose normal. No mucosal edema or rhinorrhea.      Mouth/Throat:      Mouth: Mucous membranes are moist.      Pharynx: Oropharynx is clear. Uvula midline.   Eyes:      General: Lids are normal.      Extraocular Movements: Extraocular movements intact.      Conjunctiva/sclera: Conjunctivae normal.      Pupils: Pupils are equal, round, and reactive to light.   Neck:      Thyroid: No " thyroid mass or thyromegaly.   Cardiovascular:      Rate and Rhythm: Regular rhythm.      Pulses: Normal pulses.      Heart sounds: S1 normal and S2 normal. No murmur heard.    No friction rub. No gallop.   Pulmonary:      Effort: Pulmonary effort is normal.      Breath sounds: Normal breath sounds. No wheezing, rhonchi or rales.   Abdominal:      General: Bowel sounds are normal.      Palpations: Abdomen is soft.      Tenderness: There is no abdominal tenderness. There is no guarding.      Hernia: No hernia is present.   Musculoskeletal:         General: No deformity. Normal range of motion.      Cervical back: Normal range of motion and neck supple.   Lymphadenopathy:      Cervical: No cervical adenopathy.   Skin:     General: Skin is warm and dry.      Findings: No lesion or rash.   Neurological:      General: No focal deficit present.      Mental Status: He is alert and oriented to person, place, and time.      Cranial Nerves: No cranial nerve deficit.      Sensory: No sensory deficit.      Motor: Motor function is intact.      Coordination: Coordination is intact.      Gait: Gait normal.      Deep Tendon Reflexes: Reflexes are normal and symmetric.   Psychiatric:         Attention and Perception: He is attentive.         Mood and Affect: Mood and affect normal.         Speech: Speech normal.         Behavior: Behavior normal.         Thought Content: Thought content normal.         No results found for this or any previous visit (from the past 336 hour(s)).    Each of these lab results were discussed individually in detail with the patient.      Assessment & Plan     Diagnoses and all orders for this visit:    1. Encounter for Medicare annual wellness exam (Primary)  -     CBC (No Diff)  -     Comprehensive Metabolic Panel  -     Lipid Panel With / Chol / HDL Ratio  -     TSH  -     Urinalysis With Culture If Indicated - Urine, Clean Catch    2. Mass of soft tissue of thigh  -     Ambulatory Referral to General  Surgery    3. Chronic midline low back pain with bilateral sciatica  -     Ambulatory Referral to Physical Therapy Evaluate and treat    4. Encounter for screening for malignant neoplasm of prostate  -     PSA Screen        Medicare Risks and Personalized Health Plan    Advanced Care Planning:  ACP discussion was held with the patient during this visit. Patient does not have an advance directive, information provided.    CMS Preventative Services Quick Reference  Advance Directive Discussion  Cardiovascular risk  Diabetic Lab Screening   Immunizations Discussed/Encouraged (specific immunizations; Tdap, Influenza, Shingrix and COVID19 )  Prostate Cancer Screening         The above risks/problems have been discussed with the patient.  Pertinent information has been shared with the patient in the After Visit Summary.  Follow up plans and orders are seen below in the Assessment/Plan Section.      Follow Up:  Return in about 1 year (around 7/28/2023) for Medicare Wellness., sooner if needed.    An After Visit Summary and PPPS were given to the patient.

## 2022-07-29 LAB
ALBUMIN SERPL-MCNC: 4.4 G/DL (ref 3.8–4.8)
ALBUMIN/GLOB SERPL: 1.5 {RATIO} (ref 1.2–2.2)
ALP SERPL-CCNC: 87 IU/L (ref 44–121)
ALT SERPL-CCNC: 26 IU/L (ref 0–44)
APPEARANCE UR: CLEAR
AST SERPL-CCNC: 28 IU/L (ref 0–40)
BACTERIA #/AREA URNS HPF: ABNORMAL /[HPF]
BILIRUB SERPL-MCNC: 0.8 MG/DL (ref 0–1.2)
BILIRUB UR QL STRIP: NEGATIVE
BUN SERPL-MCNC: 10 MG/DL (ref 8–27)
BUN/CREAT SERPL: 11 (ref 10–24)
CALCIUM SERPL-MCNC: 9.7 MG/DL (ref 8.6–10.2)
CASTS URNS QL MICRO: ABNORMAL /LPF
CHLORIDE SERPL-SCNC: 99 MMOL/L (ref 96–106)
CHOLEST SERPL-MCNC: 233 MG/DL (ref 100–199)
CHOLEST/HDLC SERPL: 4.3 RATIO (ref 0–5)
CO2 SERPL-SCNC: 23 MMOL/L (ref 20–29)
COLOR UR: YELLOW
CREAT SERPL-MCNC: 0.89 MG/DL (ref 0.76–1.27)
CRYSTALS URNS MICRO: ABNORMAL
EGFRCR SERPLBLD CKD-EPI 2021: 94 ML/MIN/1.73
EPI CELLS #/AREA URNS HPF: ABNORMAL /HPF (ref 0–10)
ERYTHROCYTE [DISTWIDTH] IN BLOOD BY AUTOMATED COUNT: 12.3 % (ref 11.6–15.4)
GLOBULIN SER CALC-MCNC: 3 G/DL (ref 1.5–4.5)
GLUCOSE SERPL-MCNC: 111 MG/DL (ref 65–99)
GLUCOSE UR QL STRIP: NEGATIVE
HCT VFR BLD AUTO: 49.5 % (ref 37.5–51)
HDLC SERPL-MCNC: 54 MG/DL
HGB BLD-MCNC: 17.2 G/DL (ref 13–17.7)
HGB UR QL STRIP: NEGATIVE
KETONES UR QL STRIP: NEGATIVE
LDLC SERPL CALC-MCNC: 141 MG/DL (ref 0–99)
LEUKOCYTE ESTERASE UR QL STRIP: NEGATIVE
MCH RBC QN AUTO: 32.8 PG (ref 26.6–33)
MCHC RBC AUTO-ENTMCNC: 34.7 G/DL (ref 31.5–35.7)
MCV RBC AUTO: 95 FL (ref 79–97)
MICRO URNS: NORMAL
MICRO URNS: NORMAL
MUCOUS THREADS URNS QL MICRO: PRESENT
NITRITE UR QL STRIP: NEGATIVE
PH UR STRIP: 5.5 [PH] (ref 5–7.5)
PLATELET # BLD AUTO: 273 X10E3/UL (ref 150–450)
POTASSIUM SERPL-SCNC: 4.3 MMOL/L (ref 3.5–5.2)
PROT SERPL-MCNC: 7.4 G/DL (ref 6–8.5)
PROT UR QL STRIP: NORMAL
PSA SERPL-MCNC: 2.1 NG/ML (ref 0–4)
RBC # BLD AUTO: 5.24 X10E6/UL (ref 4.14–5.8)
RBC #/AREA URNS HPF: ABNORMAL /HPF (ref 0–2)
SODIUM SERPL-SCNC: 141 MMOL/L (ref 134–144)
SP GR UR STRIP: 1.02 (ref 1–1.03)
TRIGL SERPL-MCNC: 213 MG/DL (ref 0–149)
TSH SERPL DL<=0.005 MIU/L-ACNC: 3 UIU/ML (ref 0.45–4.5)
UNIDENT CRYS URNS QL MICRO: PRESENT
URINALYSIS REFLEX: NORMAL
UROBILINOGEN UR STRIP-MCNC: 0.2 MG/DL (ref 0.2–1)
VLDLC SERPL CALC-MCNC: 38 MG/DL (ref 5–40)
WBC # BLD AUTO: 5.2 X10E3/UL (ref 3.4–10.8)
WBC #/AREA URNS HPF: ABNORMAL /HPF (ref 0–5)

## 2022-08-18 ENCOUNTER — OFFICE VISIT (OUTPATIENT)
Dept: SURGERY | Facility: CLINIC | Age: 67
End: 2022-08-18

## 2022-08-18 VITALS — WEIGHT: 205.4 LBS | BODY MASS INDEX: 24.25 KG/M2 | HEIGHT: 77 IN

## 2022-08-18 DIAGNOSIS — D49.2 SOFT TISSUE NEOPLASM: Primary | ICD-10-CM

## 2022-08-18 PROCEDURE — 99203 OFFICE O/P NEW LOW 30 MIN: CPT | Performed by: SURGERY

## 2022-08-18 RX ORDER — CEFAZOLIN SODIUM 2 G/100ML
2 INJECTION, SOLUTION INTRAVENOUS ONCE
Status: CANCELLED | OUTPATIENT
Start: 2022-09-28 | End: 2022-08-18

## 2022-08-18 NOTE — PROGRESS NOTES
Subjective   Juventino Johnson is a 67 y.o. male who presents to the office in surgical consultation from Nathaly Branham APRN for a soft tissue neoplasm of the right thigh.    History of Present Illness     The patient has a subcutaneous mass of the right thigh several centimeters above the knee on the anterior surface that has been present for many years.  It is slowly getting larger and is intermittently symptomatic when pressure is applied.  There has never been any trauma to the area.  It has never been infected.  There has never been any drainage.    He also has a subcutaneous mass of the left anterior shoulder that has been present for many years but is smaller and does not seem to be getting any larger.  It is asymptomatic.    Review of Systems   Constitutional: Negative for fatigue and fever.   Respiratory: Negative for chest tightness and shortness of breath.    Cardiovascular: Negative for chest pain and palpitations.   Gastrointestinal: Negative for abdominal pain, blood in stool, constipation, diarrhea, nausea and vomiting.     Past Medical History:   Diagnosis Date   • Alcohol use 04/05/2018   • Colon polyp    • Diverticulosis of large intestine without hemorrhage 09/18/2019   • Hyperlipidemia    • Hypertension    • Low back pain    • PONV (postoperative nausea and vomiting) when 17 for appendicitis   • Rectal bleeding      Past Surgical History:   Procedure Laterality Date   • APPENDECTOMY     • EYE SURGERY Left      Family History   Problem Relation Age of Onset   • Kidney cancer Mother    • Cancer Mother         kidney cancer   • Miscarriages / Stillbirths Mother         miscarriages   • Pneumonia Father    • Alcohol abuse Father    • Colon cancer Sister    • Thyroid disease Sister    • No Known Problems Daughter    • Down syndrome Son    • Autism spectrum disorder Son    • Colon cancer Maternal Grandmother    • Cancer Maternal Grandmother         colon cancer   • No Known Problems Daughter    •  Cancer Sister         colon cancer   • COPD Sister         long time smoker   • Learning disabilities Son         Down syndrome   • Mental retardation Son         Down syndrome     Social History     Socioeconomic History   • Marital status:    Tobacco Use   • Smoking status: Former Smoker     Packs/day: 1.00     Years: 20.00     Pack years: 20.00     Types: Cigarettes     Start date: 8/15/1971     Quit date: 8/15/1991     Years since quittin.0   • Smokeless tobacco: Never Used   • Tobacco comment: started about 16 years old, quit around 5 years later, then started again 3 years later, start again   Vaping Use   • Vaping Use: Never used   Substance and Sexual Activity   • Alcohol use: Yes     Alcohol/week: 14.0 standard drinks     Types: 14 Glasses of wine per week   • Drug use: No   • Sexual activity: Yes     Partners: Female       Objective   Physical Exam  Constitutional:       Appearance: Normal appearance. He is well-developed. He is not toxic-appearing.   Eyes:      General: No scleral icterus.  Pulmonary:      Effort: Pulmonary effort is normal. No respiratory distress.   Skin:     General: Skin is warm and dry.      Comments: There is a 1 cm soft tissue neoplasm of the left anterior shoulder that is mobile with well-defined margins.    There is a large soft tissue neoplasm of the right anterior thigh that measures at least 4 cm in size.  It is mobile with well-defined margins.   Neurological:      Mental Status: He is alert and oriented to person, place, and time.   Psychiatric:         Behavior: Behavior normal.         Thought Content: Thought content normal.         Judgment: Judgment normal.         Assessment & Plan     1.  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  This was discussed with him.  He was offered an excision of the soft tissue neoplasm but he is not interested in  having it removed at this time.    2.  Soft tissue neoplasm of the right anterior thigh: The patient is a large soft tissue neoplasm of the right anterior thigh that is quite raised.  This was discussed with him.  It is slowly gotten larger and an excision of the soft tissue neoplasm is appropriate.  He would like to proceed with surgery.  He will be scheduled for excision of the right thigh soft tissue neoplasm.  The patient understands the indications, alternatives, risks, and benefits of the procedure and wishes to proceed.

## 2022-09-15 ENCOUNTER — CLINICAL SUPPORT (OUTPATIENT)
Dept: INTERNAL MEDICINE | Facility: CLINIC | Age: 67
End: 2022-09-15

## 2022-09-15 DIAGNOSIS — Z23 IMMUNIZATION DUE: Primary | ICD-10-CM

## 2022-09-15 PROCEDURE — 90662 IIV NO PRSV INCREASED AG IM: CPT | Performed by: NURSE PRACTITIONER

## 2022-09-15 PROCEDURE — G0008 ADMIN INFLUENZA VIRUS VAC: HCPCS | Performed by: NURSE PRACTITIONER

## 2022-09-26 ENCOUNTER — PRE-ADMISSION TESTING (OUTPATIENT)
Dept: PREADMISSION TESTING | Facility: HOSPITAL | Age: 67
End: 2022-09-26

## 2022-09-26 VITALS
WEIGHT: 206 LBS | BODY MASS INDEX: 24.32 KG/M2 | RESPIRATION RATE: 16 BRPM | HEART RATE: 83 BPM | SYSTOLIC BLOOD PRESSURE: 170 MMHG | OXYGEN SATURATION: 98 % | DIASTOLIC BLOOD PRESSURE: 92 MMHG | TEMPERATURE: 98.7 F | HEIGHT: 77 IN

## 2022-09-26 LAB
ANION GAP SERPL CALCULATED.3IONS-SCNC: 12.5 MMOL/L (ref 5–15)
BUN SERPL-MCNC: 10 MG/DL (ref 8–23)
BUN/CREAT SERPL: 13.2 (ref 7–25)
CALCIUM SPEC-SCNC: 9.8 MG/DL (ref 8.6–10.5)
CHLORIDE SERPL-SCNC: 103 MMOL/L (ref 98–107)
CO2 SERPL-SCNC: 26.5 MMOL/L (ref 22–29)
CREAT SERPL-MCNC: 0.76 MG/DL (ref 0.76–1.27)
DEPRECATED RDW RBC AUTO: 40.9 FL (ref 37–54)
EGFRCR SERPLBLD CKD-EPI 2021: 98.5 ML/MIN/1.73
ERYTHROCYTE [DISTWIDTH] IN BLOOD BY AUTOMATED COUNT: 11.9 % (ref 12.3–15.4)
GLUCOSE SERPL-MCNC: 125 MG/DL (ref 65–99)
HCT VFR BLD AUTO: 48.7 % (ref 37.5–51)
HGB BLD-MCNC: 17.1 G/DL (ref 13–17.7)
MCH RBC QN AUTO: 32.3 PG (ref 26.6–33)
MCHC RBC AUTO-ENTMCNC: 35.1 G/DL (ref 31.5–35.7)
MCV RBC AUTO: 92.1 FL (ref 79–97)
PLATELET # BLD AUTO: 246 10*3/MM3 (ref 140–450)
PMV BLD AUTO: 8.8 FL (ref 6–12)
POTASSIUM SERPL-SCNC: 4.3 MMOL/L (ref 3.5–5.2)
RBC # BLD AUTO: 5.29 10*6/MM3 (ref 4.14–5.8)
SODIUM SERPL-SCNC: 142 MMOL/L (ref 136–145)
WBC NRBC COR # BLD: 6.91 10*3/MM3 (ref 3.4–10.8)

## 2022-09-26 PROCEDURE — 36415 COLL VENOUS BLD VENIPUNCTURE: CPT

## 2022-09-26 PROCEDURE — 80048 BASIC METABOLIC PNL TOTAL CA: CPT

## 2022-09-26 PROCEDURE — 85027 COMPLETE CBC AUTOMATED: CPT

## 2022-09-26 RX ORDER — CHLORHEXIDINE GLUCONATE 500 MG/1
CLOTH TOPICAL
COMMUNITY
End: 2022-09-28 | Stop reason: HOSPADM

## 2022-09-26 NOTE — DISCHARGE INSTRUCTIONS
Take the following medications the morning of surgery:  NONE    ARRIVAL TIME IS 0630 ON 09/28/2022          If you are on prescription narcotic pain medication to control your pain you may also take that medication the morning of surgery.    General Instructions:  Do not eat solid food after midnight the night before surgery.  You may drink clear liquids day of surgery but must stop at least one hour before your hospital arrival time.  It is beneficial for you to have a clear drink that contains carbohydrates the day of surgery.  We suggest a 12 to 20 ounce bottle of Gatorade or Powerade for non-diabetic patients or a 12 to 20 ounce bottle of G2 or Powerade Zero for diabetic patients. (Pediatric patients, are not advised to drink a 12 to 20 ounce carbohydrate drink)    Clear liquids are liquids you can see through.  Nothing red in color.     Plain water                               Sports drinks  Sodas                                   Gelatin (Jell-O)  Fruit juices without pulp such as white grape juice and apple juice  Popsicles that contain no fruit or yogurt  Tea or coffee (no cream or milk added)  Gatorade / Powerade  G2 / Powerade Zero      Patients who avoid smoking, chewing tobacco and alcohol for 4 weeks prior to surgery have a reduced risk of post-operative complications.  Quit smoking as many days before surgery as you can.  Do not smoke, use chewing tobacco or drink alcohol the day of surgery.   If applicable bring your C-PAP/ BI-PAP machine.  Bring any papers given to you in the doctor’s office.  Wear clean comfortable clothes.  Do not wear contact lenses, false eyelashes or make-up.  Bring a case for your glasses.   Bring crutches or walker if applicable.  Remove all piercings.  Leave jewelry and any other valuables at home.  Hair extensions with metal clips must be removed prior to surgery.  The Pre-Admission Testing nurse will instruct you to bring medications if unable to obtain an accurate list in  Pre-Admission Testing.          Preventing a Surgical Site Infection:  For 2 to 3 days before surgery, avoid shaving with a razor because the razor can irritate skin and make it easier to develop an infection.    Any areas of open skin can increase the risk of a post-operative wound infection by allowing bacteria to enter and travel throughout the body.  Notify your surgeon if you have any skin wounds / rashes even if it is not near the expected surgical site.  The area will need assessed to determine if surgery should be delayed until it is healed.  The night prior to surgery shower using a fresh bar of anti-bacterial soap (such as Dial) and clean washcloth.  Sleep in a clean bed with clean clothing.  Do not allow pets to sleep with you.  Shower on the morning of surgery using a fresh bar of anti-bacterial soap (such as Dial) and clean washcloth.  Dry with a clean towel and dress in clean clothing.  Ask your surgeon if you will be receiving antibiotics prior to surgery.  Make sure you, your family, and all healthcare providers clean their hands with soap and water or an alcohol based hand  before caring for you or your wound.    CHLORHEXIDINE CLOTH INSTRUCTIONS  The morning of surgery follow these instructions using the Chlorhexidine cloths you've been given.  These steps reduce bacteria on the body.  Do not use the cloths near your eyes, ears mouth, genitalia or on open wounds.  Throw the cloths away after use but do not try to flush them down a toilet.      Open and remove one cloth at a time from the package.    Leave the cloth unfolded and begin the bathing.  Massage the skin with the cloths using gentle pressure to remove bacteria.  Do not scrub harshly.   Follow the steps below with one 2% CHG cloth per area (6 total cloths).  One cloth for neck, shoulders and chest.  One cloth for both arms, hands, fingers and underarms (do underarms last).  One cloth for the abdomen followed by groin.  One cloth  for right leg and foot including between the toes.  One cloth for left leg and foot including between the toes.  The last cloth is to be used for the back of the neck, back and buttocks.    Allow the CHG to air dry 3 minutes on the skin which will give it time to work and decrease the chance of irritation.  The skin may feel sticky until it is dry.  Do not rinse with water or any other liquid or you will lose the beneficial effects of the CHG.  If mild skin irritation occurs, do rinse the skin to remove the CHG.  Report this to the nurse at time of admission.  Do not apply lotions, creams, ointments, deodorants or perfumes after using the clothes. Dress in clean clothes before coming to the hospital.     Day of surgery: ARRIVAL TIME IS 0630  Your arrival time is approximately two hours before your scheduled surgery time.  Upon arrival, a Pre-op nurse and Anesthesiologist will review your health history, obtain vital signs, and answer questions you may have.  The only belongings needed at this time will be a list of your home medications and if applicable your C-PAP/BI-PAP machine.  A Pre-op nurse will start an IV and you may receive medication in preparation for surgery, including something to help you relax.     Please be aware that surgery does come with discomfort.  We want to make every effort to control your discomfort so please discuss any uncontrolled symptoms with your nurse.   Your doctor will most likely have prescribed pain medications.      If you are going home after surgery you will receive individualized written care instructions before being discharged.  A responsible adult must drive you to and from the hospital on the day of your surgery and stay with you for 24 hours.  Discharge prescriptions can be filled by the hospital pharmacy during regular pharmacy hours.  If you are having surgery late in the day/evening your prescription may be e-prescribed to your pharmacy.  Please verify your pharmacy  hours or chose a 24 hour pharmacy to avoid not having access to your prescription because your pharmacy has closed for the day.    If you are staying overnight following surgery, you will be transported to your hospital room following the recovery period.  [facility] has all private rooms.    If you have any questions please call Pre-Admission Testing at [phone].  Deductibles and co-payments are collected on the day of service. Please be prepared to pay the required co-pay, deductible or deposit on the day of service as defined by your plan.    Call your surgeon immediately if you experience any of the following symptoms:  Sore Throat  Shortness of Breath or difficulty breathing  Cough  Chills  Body soreness or muscle pain  Headache  Fever  New loss of taste or smell  Do not arrive for your surgery ill.  Your procedure will need to be rescheduled to another time.  You will need to call your physician before the day of surgery to avoid any unnecessary exposure to hospital staff as well as other patients.

## 2022-09-28 ENCOUNTER — HOSPITAL ENCOUNTER (OUTPATIENT)
Facility: HOSPITAL | Age: 67
Setting detail: HOSPITAL OUTPATIENT SURGERY
Discharge: HOME | End: 2022-09-28
Attending: SURGERY | Admitting: SURGERY

## 2022-09-28 VITALS
OXYGEN SATURATION: 97 % | HEIGHT: 77 IN | HEART RATE: 88 BPM | DIASTOLIC BLOOD PRESSURE: 93 MMHG | SYSTOLIC BLOOD PRESSURE: 144 MMHG | WEIGHT: 202.6 LBS | TEMPERATURE: 98.6 F | BODY MASS INDEX: 23.92 KG/M2 | RESPIRATION RATE: 14 BRPM

## 2022-09-28 DIAGNOSIS — D49.2 SOFT TISSUE NEOPLASM: ICD-10-CM

## 2022-09-28 LAB — QT INTERVAL: 393 MS

## 2022-09-28 PROCEDURE — 93010 ELECTROCARDIOGRAM REPORT: CPT | Performed by: INTERNAL MEDICINE

## 2022-09-28 PROCEDURE — 93005 ELECTROCARDIOGRAM TRACING: CPT | Performed by: SURGERY

## 2022-09-28 PROCEDURE — 25010000002 CEFAZOLIN IN DEXTROSE 2-4 GM/100ML-% SOLUTION: Performed by: SURGERY

## 2022-09-28 PROCEDURE — 0 LIDOCAINE 1 % SOLUTION: Performed by: SURGERY

## 2022-09-28 PROCEDURE — 88304 TISSUE EXAM BY PATHOLOGIST: CPT | Performed by: SURGERY

## 2022-09-28 PROCEDURE — S0260 H&P FOR SURGERY: HCPCS | Performed by: SURGERY

## 2022-09-28 PROCEDURE — 27337 EXC THIGH/KNEE LES SC 3 CM/>: CPT | Performed by: SURGERY

## 2022-09-28 RX ORDER — MAGNESIUM HYDROXIDE 1200 MG/15ML
LIQUID ORAL AS NEEDED
Status: DISCONTINUED | OUTPATIENT
Start: 2022-09-28 | End: 2022-09-28 | Stop reason: HOSPADM

## 2022-09-28 RX ORDER — CEFAZOLIN SODIUM 2 G/100ML
2 INJECTION, SOLUTION INTRAVENOUS ONCE
Status: COMPLETED | OUTPATIENT
Start: 2022-09-28 | End: 2022-09-28

## 2022-09-28 RX ORDER — LIDOCAINE HYDROCHLORIDE 10 MG/ML
INJECTION, SOLUTION INFILTRATION; PERINEURAL AS NEEDED
Status: DISCONTINUED | OUTPATIENT
Start: 2022-09-28 | End: 2022-09-28 | Stop reason: HOSPADM

## 2022-09-28 RX ORDER — HYDROCODONE BITARTRATE AND ACETAMINOPHEN 5; 325 MG/1; MG/1
TABLET ORAL
Qty: 6 TABLET | Refills: 0 | Status: SHIPPED | OUTPATIENT
Start: 2022-09-28 | End: 2022-10-18

## 2022-09-28 RX ADMIN — CEFAZOLIN SODIUM 2 G: 2 INJECTION, SOLUTION INTRAVENOUS at 08:49

## 2022-09-28 NOTE — DISCHARGE INSTRUCTIONS
Dr. Miguel Bernstein  [address]  [place], [place] [address] [phone]    Discharge Instructions for Minor Surgery      Go home, rest and take it easy today; however, you should get up and move about several times today to reduce the risk of developing a clot in your legs.      Eat and drink lightly today.  Start off with liquids, jello, soup, crackers or other bland foods at first. You may advance your diet tomorrow as tolerated as long as you do not experience any nausea or vomiting.     You may remove your outer dressings in 2 days.  The white tapes called steri-strips should stay in place.  They will fall off on their own in 1-2 weeks.  Do not worry if they come off sooner.      You may notice some bleeding/drainage on your outer dressings. A little bloody drainage is normal. If the bleeding/drainage is such that the bandage cannot absorb it, remove the dressing, apply clean gauze and apply firm pressure for a full 15 minutes.  If the bleeding continues, please call me.    You may shower tomorrow.  No tub baths until your incisions are completely healed.         You have received a prescription for a narcotic pain medicine, as you will have some pain following surgery.   You will not be totally pain free, but your pain medicine should make the pain tolerable.  Please take your pain medicine as prescribed and always take your pills with food to prevent nausea. If you are having severe pain that cannot be controlled by the pain medicine, please contact me.      You will need to call the office at [phone] to schedule a follow-up appointment in 2 weeks.     Remember to contact me for any of the following:    Fever > 101 degrees  Severe pain that cannot be controlled by taking your pain pills  Severe nausea or vomiting   Significant bleeding of your incisions  Drainage that has a bad smell or is yellow or green in appearance  Any other questions or concerns

## 2022-09-28 NOTE — H&P
Subjective      Juventino Johnson is a 67 y.o. male who presents for a soft tissue neoplasm of the right thigh.     History of Present Illness      The patient has a subcutaneous mass of the right thigh several centimeters above the knee on the anterior surface that has been present for many years.  It is slowly getting larger and is intermittently symptomatic when pressure is applied.  There has never been any trauma to the area.  It has never been infected.  There has never been any drainage.     He also has a subcutaneous mass of the left anterior shoulder that has been present for many years but is smaller and does not seem to be getting any larger.  It is asymptomatic.     Review of Systems   Constitutional: Negative for fatigue and fever.   Respiratory: Negative for chest tightness and shortness of breath.    Cardiovascular: Negative for chest pain and palpitations.   Gastrointestinal: Negative for abdominal pain, blood in stool, constipation, diarrhea, nausea and vomiting.      Medical History        Past Medical History:   Diagnosis Date   • Alcohol use 04/05/2018   • Colon polyp     • Diverticulosis of large intestine without hemorrhage 09/18/2019   • Hyperlipidemia     • Hypertension     • Low back pain     • PONV (postoperative nausea and vomiting) when 17 for appendicitis   • Rectal bleeding           Surgical History         Past Surgical History:   Procedure Laterality Date   • APPENDECTOMY       • EYE SURGERY Left                 Family History   Problem Relation Age of Onset   • Kidney cancer Mother     • Cancer Mother           kidney cancer   • Miscarriages / Stillbirths Mother           miscarriages   • Pneumonia Father     • Alcohol abuse Father     • Colon cancer Sister     • Thyroid disease Sister     • No Known Problems Daughter     • Down syndrome Son     • Autism spectrum disorder Son     • Colon cancer Maternal Grandmother     • Cancer Maternal Grandmother           colon cancer   • No Known  Problems Daughter     • Cancer Sister           colon cancer   • COPD Sister           long time smoker   • Learning disabilities Son           Down syndrome   • Mental retardation Son           Down syndrome      Social History   Social History            Socioeconomic History   • Marital status:    Tobacco Use   • Smoking status: Former Smoker       Packs/day: 1.00       Years: 20.00       Pack years: 20.00       Types: Cigarettes       Start date: 8/15/1971       Quit date: 8/15/1991       Years since quittin.0   • Smokeless tobacco: Never Used   • Tobacco comment: started about 16 years old, quit around 5 years later, then started again 3 years later, start again   Vaping Use   • Vaping Use: Never used   Substance and Sexual Activity   • Alcohol use: Yes       Alcohol/week: 14.0 standard drinks       Types: 14 Glasses of wine per week   • Drug use: No   • Sexual activity: Yes       Partners: Female                  Objective      Physical Exam  Constitutional:       Appearance: Normal appearance. He is well-developed. He is not toxic-appearing.   Eyes:      General: No scleral icterus.  Pulmonary:      Effort: Pulmonary effort is normal. No respiratory distress.   Skin:     General: Skin is warm and dry.      Comments: There is a 1 cm soft tissue neoplasm of the left anterior shoulder that is mobile with well-defined margins.    There is a large soft tissue neoplasm of the right anterior thigh that measures at least 4 cm in size.  It is mobile with well-defined margins.   Neurological:      Mental Status: He is alert and oriented to person, place, and time.   Psychiatric:         Behavior: Behavior normal.         Thought Content: Thought content normal.         Judgment: Judgment normal.                  Assessment & Plan        1.  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  This was  discussed with him.  He was offered an excision of the soft tissue neoplasm but he is not interested in having it removed at this time.     2.  Soft tissue neoplasm of the right anterior thigh: The patient is a large soft tissue neoplasm of the right anterior thigh that is quite raised.  This was discussed with him.  It is slowly gotten larger and an excision of the soft tissue neoplasm is appropriate.  He would like to proceed with surgery.  He will be scheduled for excision of the right thigh soft tissue neoplasm.  The patient understands the indications, alternatives, risks, and benefits of the procedure and wishes to proceed.

## 2022-09-28 NOTE — OP NOTE
Surgeon: Miguel Bernstein Jr., M.D.    Assistant: LARA Bermudez    An assistant was necessary and provided valuable retraction and exposure, as well as wound closure.    Pre-Operative Diagnosis:     Soft tissue neoplasm [D49.2]    Post-Operative Diagnosis:    Large sebaceous cyst    Procedure Performed:     Excision of 6.2 cm sebaceous cyst    Indications:     The patient is a pleasant 67-year-old male with a mass of his anterior right thigh that has gotten larger.  He presents for excision of the soft tissue neoplasm.  The patient understands the indications, alternatives, risks, and benefits of the procedure and wishes to proceed.    Procedure:     The patient was identified and taken to the operating room where he was placed in the supine position on the operating table.  His vital signs and oxygen saturation were carefully monitored.  The area of the mass was prepped and draped in the standard surgical fashion.  It was infiltrated with 1% lidocaine with epinephrine.  An elliptical incision was made overlying the mass with a scalpel carried through the skin into the subcutaneous tissue.  The subcutaneous tissue was divided using Bovie cautery down to the mass which was clearly a large sebaceous cyst.  Dissection was carried around the mass to freed from subcutaneous attachments and the mass was entered with the release of sebum.  In the process of the entire mobilization most of the cyst was completely evacuated.  This allowed improved ability to elevate the sebaceous cyst off of the posterior structures and it was completely freed intact.  There was a second small sebaceous cyst, about 1 cm in size, at the superior lateral margin that was also removed in the same fashion.  The specimen was passed off for pathology.  The area was again infiltrated with 1% lidocaine without epinephrine.  Hemostasis was noted to be adequate.  The subcutaneous tissue was reapproximated 3-0 Vicryl suture in interrupted fashion.   The skin was closed with a 4-0 Monocryl in a subcuticular fashion followed by Mastisol and Steri-Strips.  Sterile dressing was applied.  The sponge, needle, and instrument counts were correct at the end of the case.  The patient tolerated procedure well and was transferred to the recovery room in stable condition.    Estimated Blood Loss:      minimal    Specimens:     Order Name Source Comment Collection Info Order Time   TISSUE PATHOLOGY EXAM Leg, Right  Collected By: Miguel Bernstein Jr., MD 9/28/2022  9:28 AM     Release to patient   Routine Release            Miguel Bernstein Jr., M.D.

## 2022-09-29 LAB
LAB AP CASE REPORT: NORMAL
PATH REPORT.FINAL DX SPEC: NORMAL
PATH REPORT.GROSS SPEC: NORMAL

## 2022-10-18 ENCOUNTER — OFFICE VISIT (OUTPATIENT)
Dept: SURGERY | Facility: CLINIC | Age: 67
End: 2022-10-18

## 2022-10-18 VITALS — BODY MASS INDEX: 24.3 KG/M2 | HEIGHT: 77 IN | WEIGHT: 205.8 LBS

## 2022-10-18 DIAGNOSIS — Z48.89 POSTOPERATIVE VISIT: Primary | ICD-10-CM

## 2022-10-18 PROCEDURE — 99024 POSTOP FOLLOW-UP VISIT: CPT | Performed by: SURGERY

## 2022-10-18 NOTE — PROGRESS NOTES
Subjective   Juventino Johnson is a 67 y.o. male who returns to the office after undergoing an excision of a large sebaceous cyst of the right thigh on 9/28/2022.     History of Present Illness     The patient is recovering well with no significant postop symptoms.  He has not had any pain from the excision site.    The pathology returned as an epidermal inclusion cyst.    Review of Systems   Constitutional: Negative for chills and fever.   Skin: Negative for rash and wound.       Objective   Physical Exam  Constitutional:       General: He is not in acute distress.     Appearance: Normal appearance. He is not ill-appearing or toxic-appearing.   Pulmonary:      Effort: Pulmonary effort is normal. No respiratory distress.   Skin:     Comments: Incision: intact with no evidence of infection.   Neurological:      Mental Status: He is alert.   Psychiatric:         Behavior: Behavior normal.         Assessment & Plan     1.  Postoperative visit: The patient is recovering well from the excision of a large sebaceous cyst of the right thigh.  At this point he has no limitations.  He will follow-up on an as-needed basis.

## 2022-11-22 NOTE — SIGNIFICANT NOTE
Education provided the Patient on the following:    - Nothing to Eat or Drink after MN the night before the procedure    - Avoid red/purple fluids while completing their bowel prep as ordered by physician  -Contact Gastrointerologist office for any questions about specific details regarding colon prep    -You will need to have someone drive you home after your colonoscopy and remain with you for 24 hours after the procedure  - The date of your Surgery, you may have one visitor at bedside or within 10-15 minutes of [facility]  -Please wear warm socks when you arrive for your colonoscopy  -Remove all jewelry and leave any valuables before arriving the day of your procedure (all will have to be removed before leaving preop)  -You will need to arrive at 0915 on 11-23-22 for your colonoscopy    -Feel free to contact us at: [phone] with any additional questions/concerns

## 2022-11-23 ENCOUNTER — ANESTHESIA (OUTPATIENT)
Dept: SURGERY | Facility: SURGERY CENTER | Age: 67
End: 2022-11-23

## 2022-11-23 ENCOUNTER — ANESTHESIA EVENT (OUTPATIENT)
Dept: SURGERY | Facility: SURGERY CENTER | Age: 67
End: 2022-11-23

## 2022-11-23 ENCOUNTER — HOSPITAL ENCOUNTER (OUTPATIENT)
Facility: SURGERY CENTER | Age: 67
Setting detail: HOSPITAL OUTPATIENT SURGERY
Discharge: HOME | End: 2022-11-23
Attending: INTERNAL MEDICINE | Admitting: INTERNAL MEDICINE

## 2022-11-23 VITALS
BODY MASS INDEX: 23.53 KG/M2 | TEMPERATURE: 97.5 F | WEIGHT: 203.4 LBS | DIASTOLIC BLOOD PRESSURE: 83 MMHG | SYSTOLIC BLOOD PRESSURE: 121 MMHG | HEIGHT: 78 IN | OXYGEN SATURATION: 98 % | HEART RATE: 69 BPM | RESPIRATION RATE: 18 BRPM

## 2022-11-23 DIAGNOSIS — K62.5 RECTAL BLEEDING: ICD-10-CM

## 2022-11-23 DIAGNOSIS — Z86.010 PERSONAL HISTORY OF COLONIC POLYPS: ICD-10-CM

## 2022-11-23 DIAGNOSIS — Z80.0 FAMILY HISTORY OF COLON CANCER: ICD-10-CM

## 2022-11-23 PROCEDURE — 88305 TISSUE EXAM BY PATHOLOGIST: CPT | Performed by: INTERNAL MEDICINE

## 2022-11-23 PROCEDURE — 25010000002 PROPOFOL 10 MG/ML EMULSION: Performed by: STUDENT IN AN ORGANIZED HEALTH CARE EDUCATION/TRAINING PROGRAM

## 2022-11-23 PROCEDURE — 45380 COLONOSCOPY AND BIOPSY: CPT | Performed by: INTERNAL MEDICINE

## 2022-11-23 RX ORDER — LIDOCAINE HYDROCHLORIDE 20 MG/ML
INJECTION, SOLUTION INFILTRATION; PERINEURAL AS NEEDED
Status: DISCONTINUED | OUTPATIENT
Start: 2022-11-23 | End: 2022-11-23 | Stop reason: SURG

## 2022-11-23 RX ORDER — LIDOCAINE HYDROCHLORIDE 10 MG/ML
0.5 INJECTION, SOLUTION INFILTRATION; PERINEURAL ONCE AS NEEDED
Status: DISCONTINUED | OUTPATIENT
Start: 2022-11-23 | End: 2022-11-23 | Stop reason: HOSPADM

## 2022-11-23 RX ORDER — SODIUM CHLORIDE 0.9 % (FLUSH) 0.9 %
10 SYRINGE (ML) INJECTION AS NEEDED
Status: DISCONTINUED | OUTPATIENT
Start: 2022-11-23 | End: 2022-11-23 | Stop reason: HOSPADM

## 2022-11-23 RX ORDER — PROPOFOL 10 MG/ML
VIAL (ML) INTRAVENOUS AS NEEDED
Status: DISCONTINUED | OUTPATIENT
Start: 2022-11-23 | End: 2022-11-23 | Stop reason: SURG

## 2022-11-23 RX ORDER — SODIUM CHLORIDE, SODIUM LACTATE, POTASSIUM CHLORIDE, CALCIUM CHLORIDE 600; 310; 30; 20 MG/100ML; MG/100ML; MG/100ML; MG/100ML
1000 INJECTION, SOLUTION INTRAVENOUS CONTINUOUS
Status: DISCONTINUED | OUTPATIENT
Start: 2022-11-23 | End: 2022-11-23 | Stop reason: HOSPADM

## 2022-11-23 RX ADMIN — PROPOFOL INJECTABLE EMULSION 160 MCG/KG/MIN: 10 INJECTION, EMULSION INTRAVENOUS at 10:15

## 2022-11-23 RX ADMIN — LIDOCAINE HYDROCHLORIDE 60 MG: 20 INJECTION, SOLUTION INFILTRATION; PERINEURAL at 10:14

## 2022-11-23 RX ADMIN — PROPOFOL INJECTABLE EMULSION 120 MG: 10 INJECTION, EMULSION INTRAVENOUS at 10:14

## 2022-11-23 RX ADMIN — SODIUM CHLORIDE, SODIUM LACTATE, POTASSIUM CHLORIDE, AND CALCIUM CHLORIDE: .6; .31; .03; .02 INJECTION, SOLUTION INTRAVENOUS at 10:10

## 2022-11-23 NOTE — BRIEF OP NOTE
COLONOSCOPY  Progress Note    Juventino Johnson  11/23/2022    Pre-op Diagnosis:   Personal history of colonic polyps [Z86.010]  Rectal bleeding [K62.5]  Family history of colon cancer [Z80.0]       Post-Op Diagnosis Codes:     * Personal history of colonic polyps [Z86.010]     * Rectal bleeding [K62.5]     * Family history of colon cancer [Z80.0]     * Diverticulosis [K57.90]     * Colon polyp [K63.5]    Procedure/CPT® Codes:        Procedure(s):  COLONOSCOPY with Polypectomy        Surgeon(s):  Bossman Carrillo MD    Anesthesia: Monitored Anesthesia Care    Staff:   Endo Technician: Sarah Medrano  Endo Nurse: Patrizia Erwin RN         Estimated Blood Loss: none    Urine Voided: * No values recorded between 11/23/2022 10:08 AM and 11/23/2022 10:47 AM *    Specimens:                Specimens     ID Source Type Tests Collected By Collected At Frozen?    A Large Intestine, Transverse Colon Tissue · TISSUE PATHOLOGY EXAM   Bossman Carrillo MD 11/23/22 1025 No    Description: Transverse Polyp x3    B Large Intestine, Cecum Tissue · TISSUE PATHOLOGY EXAM   Bossman Carrillo MD 11/23/22 1027 No    Description: Cecal Polyp    C Large Intestine, Right / Ascending Colon Tissue · TISSUE PATHOLOGY EXAM   Bossman Carrillo MD 11/23/22 1029 No    Description: ascending polyp    D Large Intestine, Sigmoid Colon Tissue · TISSUE PATHOLOGY EXAM   Bossman Carrillo MD 11/23/22 1035 No    Description: sigmoid polyp    E Large Intestine, Rectum Tissue · TISSUE PATHOLOGY EXAM   Bossman Carrillo MD 11/23/22 1039 No    Description: rectal polyp x5                Drains: * No LDAs found *    Findings: Colon to TI good Prep  Sigmoid Diverticulosis  [id number]        Complications: none          Bossman Carrillo MD     Date: 11/23/2022  Time: 10:48 EST

## 2022-11-23 NOTE — ANESTHESIA POSTPROCEDURE EVALUATION
Patient: Juventino Johnson    Procedure Summary     Date: 11/23/22 Room / Location: [facility]    Anesthesia Start: 1008 Anesthesia Stop: 1049    Procedure: COLONOSCOPY with Polypectomy Diagnosis:       Personal history of colonic polyps      Rectal bleeding      Family history of colon cancer      Diverticulosis      Colon polyp      (Personal history of colonic polyps [Z86.010])      (Rectal bleeding [K62.5])      (Family history of colon cancer [Z80.0])    Surgeons: Bossman Carrillo MD Provider: Francisco Javier Acosta MD    Anesthesia Type: MAC ASA Status: 2          Anesthesia Type: MAC    Vitals  Vitals Value Taken Time   /81 11/23/22 1049   Temp 36.4 °C (97.5 °F) 11/23/22 1049   Pulse 66 11/23/22 1049   Resp 18 11/23/22 1049   SpO2 96 % 11/23/22 1049           Post Anesthesia Care and Evaluation    Patient location during evaluation: bedside  Patient participation: complete - patient participated  Level of consciousness: awake and alert  Pain management: adequate    Airway patency: patent  Anesthetic complications: No anesthetic complications  PONV Status: controlled  Cardiovascular status: blood pressure returned to baseline and acceptable  Respiratory status: acceptable  Hydration status: acceptable

## 2022-11-23 NOTE — ANESTHESIA PREPROCEDURE EVALUATION
Anesthesia Evaluation     Patient summary reviewed and Nursing notes reviewed   history of anesthetic complications: PONV  NPO Solid Status: > 8 hours  NPO Liquid Status: > 2 hours           Airway   Mallampati: II  TM distance: >3 FB  Neck ROM: full  Dental      Pulmonary    Cardiovascular     ECG reviewed    (+) hypertension, hyperlipidemia,       Neuro/Psych  GI/Hepatic/Renal/Endo      Musculoskeletal     Abdominal    Substance History      OB/GYN          Other                        Anesthesia Plan    ASA 2     MAC     intravenous induction     Anesthetic plan, risks, benefits, and alternatives have been provided, discussed and informed consent has been obtained with: patient.        CODE STATUS:

## 2022-11-23 NOTE — H&P
"Patient Care Team:  Nathaly Branham APRN as PCP - General (Family Medicine)  Gerardo, Bossman Figueroa MD as Consulting Physician (Gastroenterology)    CHIEF COMPLAINT: Personal hx colon polyps    HISTORY OF PRESENT ILLNESS:  Last exam was 2018    Past Medical History:   Diagnosis Date   • Alcohol use 04/05/2018   • Colon polyp    • DDD (degenerative disc disease), lumbar    • Diverticulosis of large intestine without hemorrhage 09/18/2019   • History of shingles     'AGE 14\"   • Hyperlipidemia    • Hypertension    • Low back pain    • PONV (postoperative nausea and vomiting)    • Rectal bleeding    • Soft tissue neoplasm     RIGHT THIGH   • Wears partial dentures      Past Surgical History:   Procedure Laterality Date   • APPENDECTOMY      17 YEARS OLD   • CATARACT EXTRACTION Left    • COLONOSCOPY W/ POLYPECTOMY     • EXCISION MASS LEG Right 9/28/2022    Procedure: Excision of soft tissue neoplasm of right thigh;  Surgeon: Miguel Bernstein Jr., MD;  Location: [facility];  Service: General;  Laterality: Right;   • EYE SURGERY Left      Family History   Problem Relation Age of Onset   • Kidney cancer Mother    • Cancer Mother         kidney cancer   • Miscarriages / Stillbirths Mother         miscarriages   • Pneumonia Father    • Alcohol abuse Father    • Colon cancer Sister    • Thyroid disease Sister    • Cancer Sister         colon cancer   • COPD Sister         long time smoker   • No Known Problems Daughter    • No Known Problems Daughter    • Down syndrome Son    • Autism spectrum disorder Son    • Learning disabilities Son         Down syndrome   • Mental retardation Son         Down syndrome   • Colon cancer Maternal Grandmother    • Cancer Maternal Grandmother         colon cancer   • Malig Hyperthermia Neg Hx      Social History     Tobacco Use   • Smoking status: Former     Packs/day: 1.00     Years: 20.00     Pack years: 20.00     Types: Cigarettes     Start date: 8/15/1971     Quit date: " "8/15/1991     Years since quittin.2   • Smokeless tobacco: Never   • Tobacco comments:     started about 16 years old, quit around 5 years later, then started again 3 years later, start again   Vaping Use   • Vaping Use: Never used   Substance Use Topics   • Alcohol use: Yes     Alcohol/week: 14.0 standard drinks     Types: 14 Glasses of wine per week     Comment: '2GLASSES OF WINE AND A BEER FOR DINNER\"   • Drug use: Yes     Types: Marijuana, Other     Comment: 'ONCE A MONTH\" 'CBD OIL FOR BACK PAIN'     No medications prior to admission.     Allergies:  Patient has no known allergies.    REVIEW OF SYSTEMS:  Please see the above history of present illness for pertinent positives and negatives.  The remainder of the patient's systems have been reviewed and are negative.     Vital Signs  Temp:  [97.5 °F (36.4 °C)] 97.5 °F (36.4 °C)  Heart Rate:  [81] 81  Resp:  [16] 16  BP: (158)/(90) 158/90    Flowsheet Rows    Flowsheet Row First Filed Value   Admission Height 196.9 cm (77.5\") Documented at 2022 1054   Admission Weight 90.7 kg (200 lb) Documented at 2022 1054           Physical Exam:  Physical Exam   Constitutional: Patient appears well-developed and well-nourished and in no acute distress   HEENT:   Head: Normocephalic and atraumatic.   Eyes:  Pupils are equal, round, and reactive to light. EOM are intact. Sclerae are anicteric and non-injected.  Mouth and Throat: Patient has moist mucous membranes. Oropharynx is clear of any erythema or exudate.     Neck: Neck supple. No JVD present. No thyromegaly present. No lymphadenopathy present.  Cardiovascular: Regular rate, regular rhythm, S1 normal and S2 normal.  Exam reveals no gallop and no friction rub.  No murmur heard.  Pulmonary/Chest: Lungs are clear to auscultation bilaterally. No respiratory distress. No wheezes. No rhonchi. No rales.   Abdominal: Soft. Bowel sounds are normal. No distension and no mass. There is no hepatosplenomegaly. There is " no tenderness.   Musculoskeletal: Normal Muscle tone  Extremities: No edema. Pulses are palpable in all 4 extremities.  Neurological: Patient is alert and oriented to person, place, and time. Cranial nerves II-XII are grossly intact with no focal deficits.  Skin: Skin is warm. No rash noted. Nails show no clubbing.  No cyanosis or erythema.    Debilities/Disabilities Identified: None  Emotional Behavior: Appropriate     Results Review:   I reviewed the patient's new clinical results.    Lab Results (most recent)     None          Imaging Results (Most Recent)     None        reviewed    ECG/EMG Results (most recent)     None        reviewed    Assessment & Plan   Personal hx colon polyps/  colonoscopy      I discussed the patient's findings and my recommendations with patient.     Bossman Carrillo MD  11/23/22  10:03 EST    Time: 10 min prior to procedure.

## 2022-11-25 LAB
LAB AP CASE REPORT: NORMAL
PATH REPORT.FINAL DX SPEC: NORMAL
PATH REPORT.GROSS SPEC: NORMAL

## 2023-07-20 ENCOUNTER — TRANSCRIBE ORDERS (OUTPATIENT)
Dept: ADMINISTRATIVE | Facility: HOSPITAL | Age: 68
End: 2023-07-20
Payer: MEDICARE

## 2023-07-20 DIAGNOSIS — Z13.6 ENCOUNTER FOR SCREENING FOR VASCULAR DISEASE: Primary | ICD-10-CM

## 2023-07-25 DIAGNOSIS — Z00.00 ROUTINE HEALTH MAINTENANCE: Primary | ICD-10-CM

## 2023-07-25 DIAGNOSIS — Z12.5 ENCOUNTER FOR SCREENING FOR MALIGNANT NEOPLASM OF PROSTATE: ICD-10-CM

## 2023-08-01 ENCOUNTER — OFFICE VISIT (OUTPATIENT)
Dept: INTERNAL MEDICINE | Facility: CLINIC | Age: 68
End: 2023-08-01
Payer: MEDICARE

## 2023-08-01 VITALS
WEIGHT: 204 LBS | HEIGHT: 78 IN | DIASTOLIC BLOOD PRESSURE: 72 MMHG | TEMPERATURE: 98.2 F | BODY MASS INDEX: 23.6 KG/M2 | OXYGEN SATURATION: 99 % | HEART RATE: 73 BPM | RESPIRATION RATE: 16 BRPM | SYSTOLIC BLOOD PRESSURE: 110 MMHG

## 2023-08-01 DIAGNOSIS — E78.2 MIXED HYPERLIPIDEMIA: ICD-10-CM

## 2023-08-01 DIAGNOSIS — Z00.00 ENCOUNTER FOR MEDICARE ANNUAL WELLNESS EXAM: Primary | ICD-10-CM

## 2023-08-01 DIAGNOSIS — M51.36 DDD (DEGENERATIVE DISC DISEASE), LUMBAR: ICD-10-CM

## 2023-08-01 PROCEDURE — 1159F MED LIST DOCD IN RCRD: CPT | Performed by: NURSE PRACTITIONER

## 2023-08-01 PROCEDURE — 1170F FXNL STATUS ASSESSED: CPT | Performed by: NURSE PRACTITIONER

## 2023-08-01 PROCEDURE — G0439 PPPS, SUBSEQ VISIT: HCPCS | Performed by: NURSE PRACTITIONER

## 2023-08-01 PROCEDURE — 1160F RVW MEDS BY RX/DR IN RCRD: CPT | Performed by: NURSE PRACTITIONER

## 2023-08-15 ENCOUNTER — HOSPITAL ENCOUNTER (OUTPATIENT)
Dept: PHYSICAL THERAPY | Facility: HOSPITAL | Age: 68
Setting detail: THERAPIES SERIES
Discharge: HOME | End: 2023-08-15
Payer: MEDICARE

## 2023-08-15 DIAGNOSIS — M51.36 DDD (DEGENERATIVE DISC DISEASE), LUMBAR: Primary | ICD-10-CM

## 2023-08-15 PROCEDURE — G0283 ELEC STIM OTHER THAN WOUND: HCPCS

## 2023-08-15 PROCEDURE — 97110 THERAPEUTIC EXERCISES: CPT

## 2023-08-15 PROCEDURE — 97162 PT EVAL MOD COMPLEX 30 MIN: CPT

## 2023-08-15 NOTE — THERAPY EVALUATION
"  Outpatient Physical Therapy Ortho Initial Evaluation   Tiff Dobbs     Patient Name: Juventino Johnson  : 1955  MRN: [id number]  Today's Date: 8/15/2023      Visit Date: 08/15/2023    Patient Active Problem List   Diagnosis    Mixed hyperlipidemia    Cyst of knee joint, right    IFG (impaired fasting glucose)    Asymptomatic microscopic hematuria    Diverticulosis of large intestine without hemorrhage    DDD (degenerative disc disease), lumbar    Personal history of colonic polyps    Rectal bleeding    Family history of colon cancer    Soft tissue neoplasm        Past Medical History:   Diagnosis Date    Alcohol use 2018    Colon polyp     DDD (degenerative disc disease), lumbar     Diverticulosis of large intestine without hemorrhage 2019    History of shingles     'AGE 14\"    Hyperlipidemia     Hypertension     Low back pain     PONV (postoperative nausea and vomiting)     Rectal bleeding     Soft tissue neoplasm     RIGHT THIGH    Wears partial dentures         Past Surgical History:   Procedure Laterality Date    APPENDECTOMY      17 YEARS OLD    CATARACT EXTRACTION Left     COLONOSCOPY N/A 2022    Procedure: COLONOSCOPY with Polypectomy;  Surgeon: Bossman Carrillo MD;  [place] [facility] [place]  Service: Gastroenterology;  Laterality: N/A;  Diverticulosis, Transverse Polyp x3, Cecal Polyp, Ascending polyp, sigmoid polyp, rectal polyp x5    COLONOSCOPY W/ POLYPECTOMY      EXCISION MASS LEG Right 2022    Procedure: Excision of soft tissue neoplasm of right thigh;  Surgeon: Miguel Bernstein Jr., MD;  Location: [facility] MAIN OR;  Service: General;  Laterality: Right;    EYE SURGERY Left        Visit Dx:     ICD-10-CM ICD-9-CM   1. DDD (degenerative disc disease), lumbar  M51.36 722.52          Patient History       Row Name 08/15/23 1000             History    Chief Complaint Difficulty with daily activities;Joint stiffness;Muscle tenderness;Muscle weakness;Pain;Tightness  -LN   " "   Type of Pain Back pain;Hip pain  -LN      Date Current Problem(s) Began --  \"years\"  -LN      Brief Description of Current Complaint Pt reports hx of LBP \"all his adult life.\"\"I think it started when I was a teenager and I fell back and bruised my tailbone.\" Pt reports the pain has gradually increased over the years; he c/o stiffness in his back which affects his balance and his mobility. No other injuries reported to LB. No hx of any lumbar surgery. Pt c/o occasional pain in left hip. With severe pain; he has had some shooting pain in legs and N/T in legs. Pt had an x-ray of lumbar spine in 2021 which showed DDD in lumbar spine with significant disc space narrowing at L5,S1 with multiple osteophytes. No recent tests done per pt.  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 PCP  -LN      How has patient tried to help current problem? Aleve PRN; CP; rarely uses HP; back brace  -LN      What clinical tests have you had for this problem? X-ray  -LN      Results of Clinical Tests No subluxation. Degenerative changes are present with significant disc  space narrowing at L5-S1. Degenerative changes appear stable from CT  scan abdomen and pelvis 11/13/2019.  -LN      Related/Recent Hospitalizations No  -LN      Surgery/Hospitalization n/a  -LN      History of Previous Related Injuries injury when he was a teenager and he fell back and bruised his tailbone  -LN         Pain     Pain Location Back  central  -LN      Pain at Present 6  -LN      Pain at Best 4  -LN      Pain at Worst 9;10  -LN      Pain Frequency Constant/continuous  -LN      Pain Description Sore  \"sore muscle\"' " when it gets bad= sharp pain and radiating pain  -LN      What Performance Factors Make the Current Problem(s) WORSE? sitting;lying down in 1 position; bending  -LN      What Performance Factors Make the Current Problem(s) BETTER? standing; changing positions; shifting a lot; walking  -LN      Tolerance Time- Standing relieves pain  -LN      Tolerance Time- Sitting limited to 1 hour max  -LN      Tolerance Time- Walking can relieve pain  -LN      Tolerance Time- Lying limited; has to change positions often  -LN      Is your sleep disturbed? Yes  -LN      What position do you sleep in? Right sidelying;Left sidelying  -LN      Difficulties at work? none reported  -LN      Difficulties with ADL's? pain with bending over for shoes  -LN      Difficulties with recreational activities? none reported  -LN         Fall Risk Assessment    Any falls in the past year: No  -LN         Services    Prior Rehab/Home Health Experiences No  -LN      Are you currently receiving Home Health services No  -LN      Do you plan to receive Home Health services in the near future No  -LN         Daily Activities    Primary Language English  -LN      Are you able to read Yes  -LN      Are you able to write Yes  -LN      How does patient learn best? Listening;Demonstration  -LN      Teaching needs identified Home Exercise Program;Management of Condition;Other (comment)  Risks and benefits of treatment explained to pt.  -LN      Patient is concerned about/has problems with Bed Mobility;Difficulty with self care (i.e. bathing, dressing, toileting:;Flexibility;Grasping objects lifting;Performing home management (household chores, shopping, care of dependents);Performing job responsibilities/community activities (work, school,;Performing sports, recreation, and play activities;Sitting;Transfers (getting out of a chair, bed)  -LN      Does patient have problems with the following? None  -LN      Barriers to learning None  -LN      Pt Participated  in POC and Goals Yes  -LN         Safety    Are you being hurt, hit, or frightened by anyone at home or in your life? No  -LN      Are you being neglected by a caregiver No  -LN      Have you had any of the following issues with N/A  -LN                User Key  (r) = Recorded By, (t) = Taken By, (c) = Cosigned By      Initials Name Provider Type    LN Sandy Redding, PT Physical Therapist                     PT Ortho       Row Name 08/15/23 0900       Precautions and Contraindications    Precautions/Limitations no known precautions/limitations  -LN       Subjective Pain    Able to rate subjective pain? yes  -LN    Pre-Treatment Pain Level 6  -LN       Posture/Observations    Forward Head Mild;Moderate  -LN    Rounded Shoulders Bilateral:;Moderate  -LN    Lumbar lordosis Mild;Decreased;Standing posture  -LN    Iliac crests Left:;Mild;Elevated;Standing posture  -LN       Lumbar/SI Special Tests    Standing Flexion Test (SI Dysfunction) Right:;Positive  -LN    SLR (Neural Tension) Bilateral:;Negative  -LN    COOPER (hip vs. SI Dysfunction) Bilateral:;Negative  -LN       Lumbosacral Palpation    Spinous Process Tender  lower lumbar  -LN    Erector Spinae (Paraspinals) Bilateral:;Tender;Guarded/taut  -LN    Hamstring Bilateral:;Guarded/taut  -LN       Hip Special Tests    Aida's test (tightness of ITB) Bilateral:;Positive  -LN       Leg Length Test    True Equal  -LN    Apparent Unequal;Right Higher Leg  sx of R pelvic obliquity  -LN       General ROM    GENERAL ROM COMMENTS B LE AROM WFL-WNL.  -LN       Head/Neck/Trunk    Trunk Extension AROM 25%- LBP  -LN    Trunk Flexion AROM 50%- LBP/HS tightness  -LN    Trunk Lt Lateral Flexion AROM 25%- stiff  -LN    Trunk Rt Lateral Flexion AROM 25%- stiff  -LN    Trunk Lt Rotation AROM 75%- tight and sore  -LN    Trunk Rt Rotation AROM 75%- tight and sore  -LN       MMT Neck/Trunk    Trunk Flexion MMT, Gross Movement (4-/5) good minus  grossly  -LN    Trunk Extension  MMT, Gross Movement (3+/5) fair plus  grossly  -LN       MMT Right Lower Ext    Rt Hip Flexion MMT, Gross Movement (4/5) good  -LN    Rt Hip Extension MMT, Gross Movement (4+/5) good plus  -LN    Rt Hip ABduction MMT, Gross Movement (4+/5) good plus  -LN    Rt Hip ADduction MMT, Gross Movement (4+/5) good plus  -LN    Rt Knee Extension MMT, Gross Movement (4+/5) good plus  -LN    Rt Knee Flexion MMT, Gross Movement (4+/5) good plus  -LN    Rt Ankle Plantarflexion MMT, Gross Movement (4+/5) good plus  -LN    Rt Ankle Dorsiflexion MMT, Gross Movement (4+/5) good plus  -LN       MMT Left Lower Ext    Lt Hip Flexion MMT, Gross Movement (4/5) good  -LN    Lt Hip Extension MMT, Gross Movement (4+/5) good plus  -LN    Lt Hip ABduction MMT, Gross Movement (4+/5) good plus  -LN    Lt Hip ADduction MMT, Gross Movement (4+/5) good plus  -LN    Lt Knee Extension MMT, Gross Movement (4+/5) good plus  -LN    Lt Knee Flexion MMT, Gross Movement (4+/5) good plus  -LN    Lt Ankle Plantarflexion MMT, Gross Movement (4+/5) good plus  -LN    Lt Ankle Dorsiflexion MMT, Gross Movement (4+/5) good plus  -LN       Sensation    Sensation WNL? WNL  -LN    Light Touch No apparent deficits  -LN    Additional Comments no present c/o any N/T in legs  -LN       Lower Extremity Flexibility    Hamstrings Bilateral:;Moderately limited  -LN    ITB Bilateral:;Mildly limited;Moderately limited  -LN    Hip Adductors Right:;Moderately limited;Left:;Mildly limited  -LN    Hip External Rotators Right:;Moderately limited;Left:;Mildly limited  -LN    Gastrocnemius Bilateral:;Mildly limited  -LN       Gait/Stairs (Locomotion)    Comment, (Gait/Stairs) Pt independent with all functional mobility and gait; no AD used and only nominal antalgic gait noted; pt does well with log rolling but reports LBP with transitioning sit to supine and supine to sit.  -LN              User Key  (r) = Recorded By, (t) = Taken By, (c) = Cosigned By      Initials Name Provider  Type    Sandy Dumont, PT Physical Therapist                                Therapy Education  Education Details: Pt to work on HEP 1-2 x day as tolerated and use CP/HP PRN at home. Pt to avoid any heavy lifting and to watch his posture when he bends forward.  Given: HEP, Symptoms/condition management, Pain management, Posture/body mechanics  Program: New  How Provided: Verbal, Demonstration, Written  Provided to: Patient  Level of Understanding: Teach back education performed, Verbalized, Demonstrated      PT OP Goals       Row Name 08/15/23 1000          PT Short Term Goals    STG Date to Achieve 08/29/23  -LN     STG 1 Pt to verbally report decreased LBP to <6/10 at worst with ADLs and everyday activities.  -LN     STG 2 Pt independent with initial HEP issued by therapist.  -LN     STG 3 Trunk ROM improved by 25%.  -LN     STG 4 B hip flexion improved by 1/2 muscle grade.  -LN     STG 5 Pt able to maintain good pelvic alignment between PT sessions with use of MET & core stabilization program.  -LN        Long Term Goals    LTG Date to Achieve 09/12/23  -LN     LTG 1 Pt to verbally report decreased LBP to <3/10 at worst with ADLs and everyday activities.  -LN     LTG 2 Pt independent with more advanced  HEP issued by therapist.  -LN     LTG 3 Trunk ROM WFL and painfree all planes.  -LN     LTG 4 B LE strength improved to 5/5.  -LN     LTG 5 Pt to have improved sitting tolerance to > 1 hour.  -LN     LTG 6 Pt able to transfer supine to sit and sit to supine using log roll technique with no c/o any increased LBP.  -LN     LTG 7 Pt to report no disturbed sleep due to LBP 5 out of 7 nights.  -LN     LTG 8 Improved flexibility noted in B LE/hips when tested.  -LN        Time Calculation    PT Goal Re-Cert Due Date 09/12/23  -LN               User Key  (r) = Recorded By, (t) = Taken By, (c) = Cosigned By      Initials Name Provider Type    Sandy Dumont, PT Physical Therapist                      PT Assessment/Plan       Row Name 08/15/23 1000          PT Assessment    Functional Limitations Limitation in home management;Limitations in community activities;Limitations in functional capacity and performance;Performance in leisure activities;Performance in self-care ADL;Performance in work activities  -LN     Impairments Impaired flexibility;Muscle strength;Pain;Posture;Range of motion;Sensation;Poor body mechanics  -LN     Assessment Comments Pt presents with hx of chronic LBP with gradual increase in pain over the years; pt c/o constant pain in central LB with occasional pain into left hip recently and when his pain is bad, he reports having radiating pain and N/T down B legs. Pt with decreased trunk ROM, decreased B hip flexion strength, decreased flexibilty B legs/hips; sx of R pelvic obliquity; decreased core strength; decreased sitting tolerance and decreased tolerance to home/ADLs/community and work activities due to above. Pt with sx of lumbar DDD/DJD.  -LN     Please refer to paper survey for additional self-reported information Yes  -LN     Rehab Potential Good  but guarded due to pt having chronic LBP  -LN     Patient/caregiver participated in establishment of treatment plan and goals Yes  -LN     Patient would benefit from skilled therapy intervention Yes  -LN        PT Plan    PT Frequency 1x/week;2x/week  -LN     Predicted Duration of Therapy Intervention (PT) 4 weeks  -LN     Planned CPT's? PT EVAL MOD COMPLELITY: 19453;PT RE-EVAL: 83044;PT THER PROC EA 15 MIN: 86263;PT MANUAL THERAPY EA 15 MIN: 11442;PT HOT OR COLD PACK TREAT MCARE;PT ELECTRICAL STIM UNATTEND: ;PT ULTRASOUND EA 15 MIN: 88390;PT TRACTION LUMBAR: 46312  -LN     Physical Therapy Interventions (Optional Details) home exercise program;lumbar stabilization;manual therapy techniques;modalities;patient/family education;postural re-education;ROM (Range of Motion);strengthening;stretching;taping;dry needling  -LN     PT Plan  "Comments See pt 1-2 x week for therapeutic exercises/stretching/core stabilization with HEP; modalities PRN (IFC/CP/MH/US); MET PRN; pt and posture education; kinesiotape PRN; consider trial of lumbar traction as indicated and consider trial of dry needling.  -LN               User Key  (r) = Recorded By, (t) = Taken By, (c) = Cosigned By      Initials Name Provider Type    Sandy Dumont, PT Physical Therapist                     Modalities       Row Name 08/15/23 0900             Ice    Ice Applied Yes  -LN      Location LB with IFC with pt supine in 90/90 with legs on stool; no pillows (towel roll for neck support).  -LN      PT Ice Rx Minutes --  15 minutes  -LN      Ice S/P Rx Yes  -LN      Patient reports will apply ice at home to involved area Yes  -LN         ELECTRICAL STIMULATION    Attended/Unattended Unattended  -LN      Stimulation Type IFC  -LN      Location/Electrode Placement/Other B lumbar PS with CP.  -LN      PT E-Stim Unattended Minutes 15  -LN                User Key  (r) = Recorded By, (t) = Taken By, (c) = Cosigned By      Initials Name Provider Type    Sandy Dumont, PT Physical Therapist                   OP Exercises       Row Name 08/15/23 0900             Precautions    Existing Precautions/Restrictions no known precautions/restrictions  -LN         Subjective Comments    Subjective Comments Pt c/o constant pain in central LBP and recently reports some pain in left hip and he reports at times, \"my back goes out\" and then he has severe pain and when this happens, he reports radiating pain and N/T in legs.  -LN         Subjective Pain    Able to rate subjective pain? yes  -LN      Pre-Treatment Pain Level 6  -LN         Total Minutes    28872 - PT Therapeutic Exercise Minutes 10  -LN         Exercise 1    Exercise Name 1 Supine hamstring stretch with sheet  -LN      Cueing 1 Verbal;Tactile;Demo  -LN      Reps 1 5 each leg  -LN      Time 1 10 sec  -LN         " Exercise 2    Exercise Name 2 Supine hamstring stretch with sheet with adduction (ITB)  -LN      Cueing 2 Verbal;Tactile;Demo  -LN      Reps 2 5 each leg  -LN      Time 2 10 sec  -LN         Exercise 3    Exercise Name 3 LTR  -LN      Cueing 3 Verbal;Tactile;Demo  -LN      Reps 3 5  -LN      Time 3 5 sec  -LN      Additional Comments within painfree range  -LN         Exercise 4    Exercise Name 4 PPT  -LN      Cueing 4 Verbal;Tactile;Demo  -LN      Reps 4 5  -LN      Time 4 5 sec  -LN      Additional Comments can also be done in sitting; progress to 10 reps as tolerated  -LN         Exercise 5    Exercise Name 5 PPT with hooklying ball squeeze  -LN      Cueing 5 Verbal;Tactile;Demo  -LN      Reps 5 5  -LN      Time 5 5 sec  -LN      Additional Comments can also be done in sitting; progress to 10 reps as tolerated  -LN                User Key  (r) = Recorded By, (t) = Taken By, (c) = Cosigned By      Initials Name Provider Type    Sandy Dumont, PT Physical Therapist                  Manual Rx (last 36 hours)       Manual Treatments       Row Name 08/15/23 1000             Manual Rx 1    Manual Rx 1 Location Pelvis  -LN      Manual Rx 1 Type MET: shotgun/R anterior innominate  -LN      Manual Rx 1 Duration Improved pelvic alignment noted after MET.  -LN                User Key  (r) = Recorded By, (t) = Taken By, (c) = Cosigned By      Initials Name Provider Type    Sandy Dumont, PT Physical Therapist                                Outcome Measure Options: Other Outcome Measure  Other Outcome Measure Tool Used  Other Outcome Measure Tool Comments: Back index= score of 23 today      Time Calculation:     Start Time: 1005  Stop Time: 1105  Time Calculation (min): 60 min  Timed Charges  35780 - PT Therapeutic Exercise Minutes: 10  Untimed Charges  PT E-Stim Unattended Minutes: 15  PT Ice Rx Minutes:  (15 minutes)  Total Minutes  Timed Charges Total Minutes: 10  Untimed Charges Total Minutes:  15   Total Minutes: 25     Therapy Charges for Today       Code Description Service Date Service Provider Modifiers Qty    09598542407 HC PT THER PROC EA 15 MIN 8/15/2023 Sandy Redding, PT GP 1    98448433052 HC PT ELECTRICAL STIM UNATTENDED 8/15/2023 Sandy Redding, PT  1    [id number] HC PT EVAL MOD COMPLEXITY 2 8/15/2023 Sandy Redding, PT GP 1            PT G-Codes  Outcome Measure Options: Other Outcome Measure         Sandy Redding, PT  8/15/2023

## 2023-08-18 ENCOUNTER — HOSPITAL ENCOUNTER (OUTPATIENT)
Dept: PHYSICAL THERAPY | Facility: HOSPITAL | Age: 68
Setting detail: THERAPIES SERIES
Discharge: HOME | End: 2023-08-18
Payer: MEDICARE

## 2023-08-18 DIAGNOSIS — M51.36 DDD (DEGENERATIVE DISC DISEASE), LUMBAR: Primary | ICD-10-CM

## 2023-08-18 PROCEDURE — 97110 THERAPEUTIC EXERCISES: CPT

## 2023-08-18 PROCEDURE — 97012 MECHANICAL TRACTION THERAPY: CPT

## 2023-08-18 NOTE — THERAPY TREATMENT NOTE
"  Outpatient Physical Therapy Ortho Treatment Note   Tiff Dobbs     Patient Name: Juventino Johnson  : 1955  MRN: [id number]  Today's Date: 2023      Visit Date: 2023    Visit Dx:    ICD-10-CM ICD-9-CM   1. DDD (degenerative disc disease), lumbar  M51.36 722.52       Patient Active Problem List   Diagnosis    Mixed hyperlipidemia    Cyst of knee joint, right    IFG (impaired fasting glucose)    Asymptomatic microscopic hematuria    Diverticulosis of large intestine without hemorrhage    DDD (degenerative disc disease), lumbar    Personal history of colonic polyps    Rectal bleeding    Family history of colon cancer    Soft tissue neoplasm        Past Medical History:   Diagnosis Date    Alcohol use 2018    Colon polyp     DDD (degenerative disc disease), lumbar     Diverticulosis of large intestine without hemorrhage 2019    History of shingles     'AGE 14\"    Hyperlipidemia     Hypertension     Low back pain     PONV (postoperative nausea and vomiting)     Rectal bleeding     Soft tissue neoplasm     RIGHT THIGH    Wears partial dentures         Past Surgical History:   Procedure Laterality Date    APPENDECTOMY      17 YEARS OLD    CATARACT EXTRACTION Left     COLONOSCOPY N/A 2022    Procedure: COLONOSCOPY with Polypectomy;  Surgeon: Bossman Carrillo MD;  Location: [facility] MAIN OR;  Service: Gastroenterology;  Laterality: N/A;  Diverticulosis, Transverse Polyp x3, Cecal Polyp, Ascending polyp, sigmoid polyp, rectal polyp x5    COLONOSCOPY W/ POLYPECTOMY      EXCISION MASS LEG Right 2022    Procedure: Excision of soft tissue neoplasm of right thigh;  Surgeon: Miguel Bernstein Jr., MD;  Location: [facility] MAIN OR;  Service: General;  Laterality: Right;    EYE SURGERY Left         PT Ortho       Row Name 23 1100       Subjective Comments    Subjective Comments pt states his pain is constant and does not change; reports he was a little sore following initial eval but " better now; pt admits he has not completed HEP at all due to trying to finish up a remodel on his house  -              User Key  (r) = Recorded By, (t) = Taken By, (c) = Cosigned By      Initials Name Provider Type    Daniel Banuelos PTA Physical Therapist Assistant                                 PT Assessment/Plan       Row Name 08/18/23 1449          PT Assessment    Assessment Comments no current change in symptoms but pt with no attempt in HEP since last session; good initial tolerance to traction and ex's; pt with good correction with MET  -        PT Plan    PT Plan Comments Cont per POC, check response to traction  -               User Key  (r) = Recorded By, (t) = Taken By, (c) = Cosigned By      Initials Name Provider Type    Daniel Banuelos PTA Physical Therapist Assistant                     Modalities       Row Name 08/18/23 1100             Precautions    Existing Precautions/Restrictions no known precautions/restrictions  -         Moist Heat    Location low back - pt hooklying  -      PT Moist Heat Minutes 10  -MH      MH Prior to Rx Yes  -         Ice    Location --  -      PT Ice Rx Minutes --  -      Ice S/P Rx --  -      Patient reports will apply ice at home to involved area --  -         ELECTRICAL STIMULATION    Attended/Unattended --  -      Stimulation Type --  -      Location/Electrode Placement/Other --  -         Traction 43256    Traction Type Lumbar  -      PT Traction Rx Minutes 10  -MH      Duration Intermittent  -MH      Position Hook-lying  90/90  -MH      Weight 85  -MH      Hold 60  -MH      Relax 20  -MH                User Key  (r) = Recorded By, (t) = Taken By, (c) = Cosigned By      Initials Name Provider Type    Daniel Banuelos PTA Physical Therapist Assistant                   OP Exercises       Row Name 08/18/23 1512 08/18/23 1100          Precautions    Existing Precautions/Restrictions -- no known precautions/restrictions  -         Subjective Comments    Subjective Comments -- pt states his pain is constant and does not change; reports he was a little sore following initial eval but better now; pt admits he has not completed HEP at all due to trying to finish up a remodel on his house  -        Total Minutes    23908 - PT Therapeutic Exercise Minutes 25  -MH --        Exercise 1    Exercise Name 1 -- (B) Supine hamstring stretch with sheet  -     Cueing 1 -- Verbal;Tactile;Demo  -     Reps 1 -- 5  -MH     Time 1 -- 10 sec  -MH        Exercise 2    Exercise Name 2 -- (B) Supine hamstring stretch with sheet with adduction (ITB)  -     Cueing 2 -- Verbal;Tactile;Demo  -     Reps 2 -- 5  -MH     Time 2 -- 10 sec  -MH        Exercise 3    Exercise Name 3 -- LTR  -     Cueing 3 -- Verbal;Tactile;Demo  -     Reps 3 -- 5  -MH     Time 3 -- 5 sec  -        Exercise 4    Exercise Name 4 -- PPT  -     Cueing 4 -- Verbal;Tactile;Demo  -     Reps 4 -- 5  -MH     Time 4 -- 5 sec  -        Exercise 5    Exercise Name 5 -- PPT with hooklying ball squeeze  -     Cueing 5 -- Verbal;Tactile;Demo  -     Reps 5 -- 5  -MH     Time 5 -- 5 sec  -               User Key  (r) = Recorded By, (t) = Taken By, (c) = Cosigned By      Initials Name Provider Type     Daniel Echols PTA Physical Therapist Assistant                             Manual Rx (last 36 hours)       Manual Treatments       Row Name 08/18/23 1100             Manual Rx 1    Manual Rx 1 Location Pelvis  -      Manual Rx 1 Type MET: shotgun/R anterior innominate  -                User Key  (r) = Recorded By, (t) = Taken By, (c) = Cosigned By      Initials Name Provider Type     Daniel Echols PTA Physical Therapist Assistant                        Therapy Education: Written instruction of hamstring w/adduction stretch issued and reviewed  Given: HEP, Symptoms/condition management  Program: Reinforced  How Provided: Verbal, Demonstration, written  Provided to:  Patient  Level of Understanding: Teach back education performed, Verbalized, Demonstrated              Time Calculation:   Start Time: 1104  Stop Time: 1203  Time Calculation (min): 59 min  Timed Charges  20574 - PT Therapeutic Exercise Minutes: 25  Untimed Charges  PT Traction Rx Minutes: 10  PT Moist Heat Minutes: 10  Total Minutes  Timed Charges Total Minutes: 25  Untimed Charges Total Minutes: 20   Total Minutes: 25  Therapy Charges for Today       Code Description Service Date Service Provider Modifiers Qty    70145153988 HC PT THER PROC EA 15 MIN 8/18/2023 Daniel Echols PTA GP 2    90713406100 HC PT-TRACTION MECHANICAL 8/18/2023 Daniel Echols PTA  1                      Daniel Echols PTA  8/18/2023

## 2023-08-23 ENCOUNTER — HOSPITAL ENCOUNTER (OUTPATIENT)
Dept: PHYSICAL THERAPY | Facility: HOSPITAL | Age: 68
Setting detail: THERAPIES SERIES
Discharge: HOME | End: 2023-08-23
Payer: MEDICARE

## 2023-08-23 DIAGNOSIS — M51.36 DDD (DEGENERATIVE DISC DISEASE), LUMBAR: Primary | ICD-10-CM

## 2023-08-23 PROCEDURE — 97140 MANUAL THERAPY 1/> REGIONS: CPT

## 2023-08-23 PROCEDURE — 97012 MECHANICAL TRACTION THERAPY: CPT

## 2023-08-23 NOTE — THERAPY TREATMENT NOTE
"  Outpatient Physical Therapy Ortho Treatment Note   Tiff Dobbs     Patient Name: Juventino Johnson  : 1955  MRN: [id number]  Today's Date: 2023      Visit Date: 2023    Visit Dx:    ICD-10-CM ICD-9-CM   1. DDD (degenerative disc disease), lumbar  M51.36 722.52       Patient Active Problem List   Diagnosis    Mixed hyperlipidemia    Cyst of knee joint, right    IFG (impaired fasting glucose)    Asymptomatic microscopic hematuria    Diverticulosis of large intestine without hemorrhage    DDD (degenerative disc disease), lumbar    Personal history of colonic polyps    Rectal bleeding    Family history of colon cancer    Soft tissue neoplasm        Past Medical History:   Diagnosis Date    Alcohol use 2018    Colon polyp     DDD (degenerative disc disease), lumbar     Diverticulosis of large intestine without hemorrhage 2019    History of shingles     'AGE 14\"    Hyperlipidemia     Hypertension     Low back pain     PONV (postoperative nausea and vomiting)     Rectal bleeding     Soft tissue neoplasm     RIGHT THIGH    Wears partial dentures         Past Surgical History:   Procedure Laterality Date    APPENDECTOMY      17 YEARS OLD    CATARACT EXTRACTION Left     COLONOSCOPY N/A 2022    Procedure: COLONOSCOPY with Polypectomy;  Surgeon: Bossman Carrillo MD;  Location: [facility] [place] MAIN OR;  Service: Gastroenterology;  Laterality: N/A;  Diverticulosis, Transverse Polyp x3, Cecal Polyp, Ascending polyp, sigmoid polyp, rectal polyp x5    COLONOSCOPY W/ POLYPECTOMY      EXCISION MASS LEG Right 2022    Procedure: Excision of soft tissue neoplasm of right thigh;  Surgeon: Miguel Bernstein Jr., MD;  Location: [facility] MAIN OR;  Service: General;  Laterality: Right;    EYE SURGERY Left         PT Ortho       Row Name 23 1000       Subjective Comments    Subjective Comments pt states he has been doing his ex's consistently and although his pain is still constant, he does feel " "\"looser;\" pt states the traction \"may have taken some of the pain away\"  -       Subjective Pain    Able to rate subjective pain? yes  -    Pre-Treatment Pain Level 5  -MH    Post-Treatment Pain Level 5  -    Subjective Pain Comment pt reports pain decreases \"a notch\" while on traction then returns as soon as he stands  -              User Key  (r) = Recorded By, (t) = Taken By, (c) = Cosigned By      Initials Name Provider Type     Daniel Echols PTA Physical Therapist Assistant                                 PT Assessment/Plan       Row Name 08/23/23 1049          PT Assessment    Assessment Comments pt reporting decreased tightness with current HEP but pain continues to remain constant; continues to require MET but corrected well; good initial reaction to progression of traction  -        PT Plan    PT Plan Comments Cont per POC, check response to increased pull on traction  -               User Key  (r) = Recorded By, (t) = Taken By, (c) = Cosigned By      Initials Name Provider Type     Daniel Echols PTA Physical Therapist Assistant                     Modalities       Row Name 08/23/23 1000             Precautions    Existing Precautions/Restrictions no known precautions/restrictions  -         Moist Heat    Location low back - pt supine 90/90  -MH      PT Moist Heat Minutes 12  -MH      MH Prior to Rx Yes  -         Traction 08710    Traction Type Lumbar  -      PT Traction Rx Minutes 12  -MH      Position Hook-lying  90/90  -MH      Weight 95  -MH      Hold 60  -MH      Relax 20  -MH                User Key  (r) = Recorded By, (t) = Taken By, (c) = Cosigned By      Initials Name Provider Type     Daniel Echols PTA Physical Therapist Assistant                   OP Exercises       Row Name 08/23/23 1000             Precautions    Existing Precautions/Restrictions no known precautions/restrictions  -         Subjective Comments    Subjective Comments pt states he has been " "doing his ex's consistently and although his pain is still constant, he does feel \"looser;\" pt states the traction \"may have taken some of the pain away\"  -         Subjective Pain    Able to rate subjective pain? yes  -      Pre-Treatment Pain Level 5  -      Post-Treatment Pain Level 5  -      Subjective Pain Comment pt reports pain decreases \"a notch\" while on traction then returns as soon as he stands  -         Exercise 1    Exercise Name 1 verbal review  -                User Key  (r) = Recorded By, (t) = Taken By, (c) = Cosigned By      Initials Name Provider Type     Daniel Echols PTA Physical Therapist Assistant                             Manual Rx (last 36 hours)       Manual Treatments       Row Name 08/23/23 1000             Manual Rx 1    Manual Rx 1 Location Pelvis  -      Manual Rx 1 Type MET: shotgun/R anterior innominate  -                User Key  (r) = Recorded By, (t) = Taken By, (c) = Cosigned By      Initials Name Provider Type     Daniel Echols PTA Physical Therapist Assistant                        Therapy Education  Given: HEP, Symptoms/condition management  Program: Reinforced  How Provided: Verbal  Provided to: Patient  Level of Understanding: Verbalized              Time Calculation:   Start Time: 0958  Stop Time: 1045  Time Calculation (min): 47 min  Untimed Charges  PT Traction Rx Minutes: 12  PT Moist Heat Minutes: 12  Total Minutes  Untimed Charges Total Minutes: 24   Total Minutes: 24  Therapy Charges for Today       Code Description Service Date Service Provider Modifiers Qty    58925414799 HC PT MANUAL THERAPY EA 15 MIN 8/23/2023 Daniel Echols PTA GP 1    05607608921 HC PT-TRACTION MECHANICAL 8/23/2023 Daniel Echols PTA  1                      Daniel Echols PTA  8/23/2023     "

## 2023-08-30 ENCOUNTER — HOSPITAL ENCOUNTER (OUTPATIENT)
Dept: PHYSICAL THERAPY | Facility: HOSPITAL | Age: 68
Setting detail: THERAPIES SERIES
Discharge: HOME | End: 2023-08-30
Payer: MEDICARE

## 2023-08-30 DIAGNOSIS — M51.36 DDD (DEGENERATIVE DISC DISEASE), LUMBAR: Primary | ICD-10-CM

## 2023-08-30 PROCEDURE — 97012 MECHANICAL TRACTION THERAPY: CPT

## 2023-08-30 PROCEDURE — 97110 THERAPEUTIC EXERCISES: CPT

## 2023-08-30 NOTE — THERAPY TREATMENT NOTE
"  Outpatient Physical Therapy Ortho Treatment Note   Tiff Dobbs     Patient Name: Juventino Johnson  : 1955  MRN: [id number]  Today's Date: 2023      Visit Date: 2023    Visit Dx:    ICD-10-CM ICD-9-CM   1. DDD (degenerative disc disease), lumbar  M51.36 722.52       Patient Active Problem List   Diagnosis    Mixed hyperlipidemia    Cyst of knee joint, right    IFG (impaired fasting glucose)    Asymptomatic microscopic hematuria    Diverticulosis of large intestine without hemorrhage    DDD (degenerative disc disease), lumbar    Personal history of colonic polyps    Rectal bleeding    Family history of colon cancer    Soft tissue neoplasm        Past Medical History:   Diagnosis Date    Alcohol use 2018    Colon polyp     DDD (degenerative disc disease), lumbar     Diverticulosis of large intestine without hemorrhage 2019    History of shingles     'AGE 14\"    Hyperlipidemia     Hypertension     Low back pain     PONV (postoperative nausea and vomiting)     Rectal bleeding     Soft tissue neoplasm     RIGHT THIGH    Wears partial dentures         Past Surgical History:   Procedure Laterality Date    APPENDECTOMY      17 YEARS OLD    CATARACT EXTRACTION Left     COLONOSCOPY N/A 2022    Procedure: COLONOSCOPY with Polypectomy;  Surgeon: Bossman Carrillo MD;  Location: [facility] MAIN OR;  Service: Gastroenterology;  Laterality: N/A;  Diverticulosis, Transverse Polyp x3, Cecal Polyp, Ascending polyp, sigmoid polyp, rectal polyp x5    COLONOSCOPY W/ POLYPECTOMY      EXCISION MASS LEG Right 2022    Procedure: Excision of soft tissue neoplasm of right thigh;  Surgeon: Miguel Bernstein Jr., MD;  Location: [facility] MAIN OR;  Service: General;  Laterality: Right;    EYE SURGERY Left         PT Ortho       Row Name 23 1000       Subjective Comments    Subjective Comments pt states he felt llike the ex's were causing irritation so he decreased to every other day and is tolerating " much better; pt feels more flexible but pain is still present  -              User Key  (r) = Recorded By, (t) = Taken By, (c) = Cosigned By      Initials Name Provider Type     Daniel Echols PTA Physical Therapist Assistant                                 PT Assessment/Plan       Row Name 08/30/23 1107          PT Assessment    Assessment Comments continued need for MET with good correction - pt instructed in self correction for home; tolerating ex's with increased flexibility noted - cues needed for PPT; tolerating traction well and pt feels it is beneficial  -        PT Plan    PT Plan Comments Pt request to trial HEP only for 1-2 weeks; pt informed he would need new MD order if returns after 4 weeks  -               User Key  (r) = Recorded By, (t) = Taken By, (c) = Cosigned By      Initials Name Provider Type     Daniel Echols PTA Physical Therapist Assistant                     Modalities       Row Name 08/30/23 1000             Moist Heat    Patient denies application of  Yes  -         Traction 27178    Traction Type Lumbar  -      PT Traction Rx Minutes 15  -      Position Hook-lying  90/90  -      Weight 100  -MH      Hold 60  -MH      Relax 20  -MH                User Key  (r) = Recorded By, (t) = Taken By, (c) = Cosigned By      Initials Name Provider Type     Daniel Echols PTA Physical Therapist Assistant                   OP Exercises       Row Name 08/30/23 1111 08/30/23 1000          Precautions    Existing Precautions/Restrictions -- no known precautions/restrictions  -        Subjective Comments    Subjective Comments -- pt states he felt llike the ex's were causing irritation so he decreased to every other day and is tolerating much better; pt feels more flexible but pain is still present  -        Total Minutes    16797 - PT Therapeutic Exercise Minutes 25  - --        Exercise 1    Exercise Name 1 -- (B) Supine hamstring stretch with sheet  -     Cueing 1  -- Verbal;Tactile;Demo  -     Reps 1 -- 5  -MH     Time 1 -- 10 sec  -        Exercise 2    Exercise Name 2 -- (B) Supine hamstring stretch with sheet with adduction (ITB)  -     Cueing 2 -- Verbal;Tactile;Demo  -     Reps 2 -- 5  -MH     Time 2 -- 10 sec  -MH        Exercise 3    Exercise Name 3 -- LTR  -     Cueing 3 -- Verbal;Tactile;Demo  -     Reps 3 -- 5  -MH     Time 3 -- 5 sec  -        Exercise 4    Exercise Name 4 -- (B) hooklying piriformis stretch  -     Cueing 4 -- Verbal;Tactile;Demo  -     Reps 4 -- 3  -MH     Time 4 -- 20 sec  -MH     Additional Comments -- pt did not apply pull on (R) side - stretch  -        Exercise 5    Exercise Name 5 -- PPT  -     Cueing 5 -- Verbal;Tactile;Demo  -     Reps 5 -- 5  -MH     Time 5 -- 5 sec  -        Exercise 6    Exercise Name 6 -- PPT with hooklying ball squeeze  -     Cueing 6 -- Verbal;Tactile;Demo  -     Reps 6 -- 5  -MH     Time 6 -- 5 sec  -               User Key  (r) = Recorded By, (t) = Taken By, (c) = Cosigned By      Initials Name Provider Type     Daniel Echols PTA Physical Therapist Assistant                                     Therapy Education  Education Details: written instruction of piriformis stretch and self correction for (R) ant innom issued and reviewed  Given: HEP, Symptoms/condition management  Program: New, Reinforced  How Provided: Verbal, Demonstration, Written  Provided to: Patient  Level of Understanding: Teach back education performed, Verbalized, Demonstrated              Time Calculation:   Start Time: 1008  Stop Time: 1104  Time Calculation (min): 56 min  Timed Charges  87079 - PT Therapeutic Exercise Minutes: 25  Untimed Charges  PT Traction Rx Minutes: 15  Total Minutes  Timed Charges Total Minutes: 25  Untimed Charges Total Minutes: 15   Total Minutes: 25  Therapy Charges for Today       Code Description Service Date Service Provider Modifiers Qty    10144717044 HC PT THER PROC EA 15 MIN  8/30/2023 Daniel Echols, PTA GP 2    69094875816 HC PT-TRACTION MECHANICAL 8/30/2023 Daniel Echols, CLAUDE  1                      Daniel Echols, CLAUDE  8/30/2023

## 2023-11-09 ENCOUNTER — CLINICAL SUPPORT (OUTPATIENT)
Dept: INTERNAL MEDICINE | Facility: CLINIC | Age: 68
End: 2023-11-09
Payer: MEDICARE

## 2023-11-09 DIAGNOSIS — Z23 NEED FOR INFLUENZA VACCINATION: Primary | ICD-10-CM

## 2023-11-09 PROCEDURE — G0008 ADMIN INFLUENZA VIRUS VAC: HCPCS | Performed by: NURSE PRACTITIONER

## 2023-11-09 PROCEDURE — 90662 IIV NO PRSV INCREASED AG IM: CPT | Performed by: NURSE PRACTITIONER

## 2023-11-27 ENCOUNTER — APPOINTMENT (OUTPATIENT)
Dept: CARDIOLOGY | Facility: HOSPITAL | Age: 68
End: 2023-11-27

## 2023-11-27 ENCOUNTER — HOSPITAL ENCOUNTER (OUTPATIENT)
Dept: CARDIOLOGY | Facility: HOSPITAL | Age: 68
Discharge: HOME | End: 2023-11-27
Admitting: NURSE PRACTITIONER

## 2023-11-27 VITALS
HEART RATE: 75 BPM | DIASTOLIC BLOOD PRESSURE: 90 MMHG | WEIGHT: 209 LBS | HEIGHT: 76 IN | BODY MASS INDEX: 25.45 KG/M2 | SYSTOLIC BLOOD PRESSURE: 145 MMHG

## 2023-11-27 DIAGNOSIS — Z13.6 ENCOUNTER FOR SCREENING FOR VASCULAR DISEASE: ICD-10-CM

## 2023-11-27 LAB
BH CV ECHO MEAS - DIST AO DIAM: 1.53 CM
BH CV VAS BP LEFT ARM: NORMAL MMHG
BH CV VAS BP RIGHT ARM: NORMAL MMHG
BH CV VAS SCREENING CAROTID CCA LEFT: NORMAL CM/SEC
BH CV VAS SCREENING CAROTID CCA RIGHT: NORMAL CM/SEC
BH CV VAS SCREENING CAROTID ICA LEFT: NORMAL CM/SEC
BH CV VAS SCREENING CAROTID ICA RIGHT: NORMAL CM/SEC
BH CV XLRA MEAS - MID AO DIAM: 2.04 CM
BH CV XLRA MEAS - PAD LEFT ABI DP: 1.17
BH CV XLRA MEAS - PAD LEFT ABI PT: 1.1
BH CV XLRA MEAS - PAD LEFT ARM: 145 MMHG
BH CV XLRA MEAS - PAD LEFT LEG DP: 170 MMHG
BH CV XLRA MEAS - PAD LEFT LEG PT: 160 MMHG
BH CV XLRA MEAS - PAD RIGHT ABI DP: 1.1
BH CV XLRA MEAS - PAD RIGHT ABI PT: 1.1
BH CV XLRA MEAS - PAD RIGHT ARM: 135 MMHG
BH CV XLRA MEAS - PAD RIGHT LEG DP: 160 MMHG
BH CV XLRA MEAS - PAD RIGHT LEG PT: 160 MMHG
BH CV XLRA MEAS - PROX AO DIAM: 2.2 CM
BH CV XLRA MEAS LEFT ICA/CCA RATIO: 0.67
BH CV XLRA MEAS LEFT PROX ECA PSV: NORMAL CM/SEC
BH CV XLRA MEAS RIGHT ICA/CCA RATIO: 0.91
BH CV XLRA MEAS RIGHT PROX ECA PSV: NORMAL CM/SEC
MAXIMAL PREDICTED HEART RATE: 152 BPM
STRESS TARGET HR: 129 BPM

## 2023-11-27 PROCEDURE — 93799 UNLISTED CV SVC/PROCEDURE: CPT

## 2024-07-17 ENCOUNTER — TELEPHONE (OUTPATIENT)
Dept: INTERNAL MEDICINE | Facility: CLINIC | Age: 69
End: 2024-07-17
Payer: MEDICARE

## 2024-07-18 DIAGNOSIS — R73.01 IFG (IMPAIRED FASTING GLUCOSE): Chronic | ICD-10-CM

## 2024-07-18 DIAGNOSIS — Z00.00 ROUTINE HEALTH MAINTENANCE: Primary | ICD-10-CM

## 2024-07-18 DIAGNOSIS — E78.2 MIXED HYPERLIPIDEMIA: Chronic | ICD-10-CM

## 2024-07-18 DIAGNOSIS — Z12.5 ENCOUNTER FOR SCREENING FOR MALIGNANT NEOPLASM OF PROSTATE: ICD-10-CM

## 2024-08-02 ENCOUNTER — OFFICE VISIT (OUTPATIENT)
Dept: INTERNAL MEDICINE | Facility: CLINIC | Age: 69
End: 2024-08-02
Payer: MEDICARE

## 2024-08-02 VITALS
TEMPERATURE: 98.1 F | HEIGHT: 76 IN | DIASTOLIC BLOOD PRESSURE: 80 MMHG | HEART RATE: 90 BPM | SYSTOLIC BLOOD PRESSURE: 130 MMHG | OXYGEN SATURATION: 96 % | WEIGHT: 223.8 LBS | BODY MASS INDEX: 27.25 KG/M2

## 2024-08-02 DIAGNOSIS — R79.89 ABNORMAL SERUM THYROID STIMULATING HORMONE (TSH) LEVEL: ICD-10-CM

## 2024-08-02 DIAGNOSIS — E78.2 MIXED HYPERLIPIDEMIA: Chronic | ICD-10-CM

## 2024-08-02 DIAGNOSIS — R73.01 IFG (IMPAIRED FASTING GLUCOSE): Chronic | ICD-10-CM

## 2024-08-02 DIAGNOSIS — M51.36 DDD (DEGENERATIVE DISC DISEASE), LUMBAR: ICD-10-CM

## 2024-08-02 DIAGNOSIS — Z00.00 ENCOUNTER FOR MEDICARE ANNUAL WELLNESS EXAM: Primary | ICD-10-CM

## 2024-08-02 DIAGNOSIS — R94.6 ABNORMAL RESULTS OF THYROID FUNCTION STUDIES: ICD-10-CM

## 2024-08-02 DIAGNOSIS — Z12.5 ENCOUNTER FOR SCREENING FOR MALIGNANT NEOPLASM OF PROSTATE: ICD-10-CM

## 2024-08-02 PROCEDURE — 1125F AMNT PAIN NOTED PAIN PRSNT: CPT | Performed by: NURSE PRACTITIONER

## 2024-08-02 PROCEDURE — G0439 PPPS, SUBSEQ VISIT: HCPCS | Performed by: NURSE PRACTITIONER

## 2024-08-02 PROCEDURE — 1160F RVW MEDS BY RX/DR IN RCRD: CPT | Performed by: NURSE PRACTITIONER

## 2024-08-02 PROCEDURE — 99213 OFFICE O/P EST LOW 20 MIN: CPT | Performed by: NURSE PRACTITIONER

## 2024-08-02 PROCEDURE — 1159F MED LIST DOCD IN RCRD: CPT | Performed by: NURSE PRACTITIONER

## 2024-08-02 PROCEDURE — 1170F FXNL STATUS ASSESSED: CPT | Performed by: NURSE PRACTITIONER

## 2024-08-02 NOTE — ASSESSMENT & PLAN NOTE
Stable  Patient counseled regarding therapeutic lifestyle changes including improved nutrition, increased exercise, and weight loss.

## 2024-08-02 NOTE — ASSESSMENT & PLAN NOTE
Patient counseled regarding therapeutic lifestyle changes including improved nutrition, increased exercise, and weight loss.

## 2024-08-02 NOTE — PROGRESS NOTES
Subjective   The ABCs of the Annual Wellness Visit  Medicare Wellness Visit      Juventino Johnson is a 69 y.o. patient who presents for a Medicare Wellness Visit.    The following portions of the patient's history were reviewed and   updated as appropriate: allergies, current medications, past family history, past medical history, past social history, past surgical history, and problem list.    Compared to one year ago, the patient's physical   health is the same.  Compared to one year ago, the patient's mental   health is the same.    Recent Hospitalizations:  He was not admitted to the hospital during the last year.     Current Medical Providers:  Patient Care Team:  Nathaly Branham APRN as PCP - General (Family Medicine)  Gerardo, Bossman Figueroa MD as Consulting Physician (Gastroenterology)      No outpatient medications have been marked as taking for the 8/2/24 encounter (Office Visit) with Nathaly Branham APRN.       No opioid medication identified on active medication list. I have reviewed chart for other potential  high risk medication/s and harmful drug interactions in the elderly.      Aspirin is not on active medication list.  Aspirin use is not indicated based on review of current medical condition/s. Risk of harm outweighs potential benefits.  .    Patient Active Problem List   Diagnosis    Mixed hyperlipidemia    Cyst of knee joint, right    IFG (impaired fasting glucose)    Asymptomatic microscopic hematuria    Diverticulosis of large intestine without hemorrhage    DDD (degenerative disc disease), lumbar    Personal history of colonic polyps    Rectal bleeding    Family history of colon cancer    Soft tissue neoplasm     Advance Care Planning (Click this link to access ACP Navigator)  Advance Directive is not on file.  ACP discussion was held with the patient during this visit. Patient does not have an advance directive, information provided.        Objective   Vitals:    08/02/24 0907  "24   BP: 146/98 130/80   BP Location: Left arm    Patient Position: Standing    Cuff Size: Adult    Pulse: 90    Temp: 98.1 °F (36.7 °C)    TempSrc: Infrared    SpO2: 96%    Weight: 102 kg (223 lb 12.8 oz)    Height: 193 cm (76\")    PainSc:   4    PainLoc: Back  Comment: lower back        Estimated body mass index is 27.24 kg/m² as calculated from the following:    Height as of this encounter: 193 cm (76\").    Weight as of this encounter: 102 kg (223 lb 12.8 oz).    BMI is >= 25 and <30. (Overweight) The following options were offered after discussion;: exercise counseling/recommendations and nutrition counseling/recommendations        Does the patient have evidence of cognitive impairment? No  Lab Results   Component Value Date    CHLPL 253 (H) 2024    TRIG 384 (H) 2024    HDL 56 2024     (H) 2024    VLDL 68 (H) 2024    HGBA1C 5.60 2024                                                                                                Health  Risk Assessment    Smoking Status:  Social History     Tobacco Use   Smoking Status Former    Current packs/day: 0.00    Average packs/day: 1 pack/day for 20.0 years (20.0 ttl pk-yrs)    Types: Cigarettes    Start date: 8/15/1971    Quit date: 8/15/1991    Years since quittin.9    Passive exposure: Past   Smokeless Tobacco Never   Tobacco Comments    started about 16 years old, quit around 5 years later, then started again 3 years later, start again     Alcohol Consumption:  Social History     Substance and Sexual Activity   Alcohol Use Yes    Alcohol/week: 14.0 standard drinks of alcohol    Types: 14 Glasses of wine per week    Comment: '2GLASSES OF WINE AND A BEER FOR DINNER\"     Fall Risk Screen:  SARINA Fall Risk Assessment was completed, and patient is at LOW risk for falls.Assessment completed on:2024    Depression Screenin/2/2024     9:09 AM   PHQ-2/PHQ-9 Depression Screening   Little Interest or " Pleasure in Doing Things 0-->not at all   Feeling Down, Depressed or Hopeless 0-->not at all   PHQ-9: Brief Depression Severity Measure Score 0     Health Habits and Functional and Cognitive Screenin/2/2024     9:10 AM   Functional & Cognitive Status   Do you have difficulty preparing food and eating? No   Do you have difficulty bathing yourself, getting dressed or grooming yourself? Yes   Do you have difficulty using the toilet? No   Do you have difficulty moving around from place to place? Yes   Do you have trouble with steps or getting out of a bed or a chair? Yes   Current Diet Limited Junk Food   Dental Exam Not up to date   Eye Exam Not up to date   Exercise (times per week) 0 times per week   Current Exercises Include No Regular Exercise   Do you need help using the phone?  No   Are you deaf or do you have serious difficulty hearing?  Yes   Do you need help to go to places out of walking distance? No   Do you need help shopping? No   Do you need help preparing meals?  No   Do you need help with housework?  No   Do you need help with laundry? No   Do you need help taking your medications? No   Do you need help managing money? No   Do you ever drive or ride in a car without wearing a seat belt? No   Have you felt unusual stress, anger or loneliness in the last month? No   Who do you live with? Spouse   If you need help, do you have trouble finding someone available to you? No   Have you been bothered in the last four weeks by sexual problems? No   Do you have difficulty concentrating, remembering or making decisions? No             Age-appropriate Screening Schedule:  Refer to the list below for future screening recommendations based on patient's age, sex and/or medical conditions. Orders for these recommended tests are listed in the plan section. The patient has been provided with a written plan.    Health Maintenance List  Health Maintenance   Topic Date Due    BMI FOLLOWUP  2024    INFLUENZA  VACCINE  08/01/2024    COVID-19 Vaccine (7 - 2023-24 season) 08/02/2025 (Originally 3/21/2024)    TDAP/TD VACCINES (1 - Tdap) 08/02/2025 (Originally [birth year]/6/1974)    ZOSTER VACCINE (1 of 2) 08/02/2025 (Originally 7/6/2005)    LIPID PANEL  07/26/2025    ANNUAL WELLNESS VISIT  08/02/2025    COLORECTAL CANCER SCREENING  11/23/2025    HEPATITIS C SCREENING  Completed    Pneumococcal Vaccine 65+  Completed    AAA SCREEN (ONE-TIME)  Completed                                                                                                                                                CMS Preventative Services Quick Reference  Risk Factors Identified During Encounter  Alcohol Misuse: Patient encouraged to limit alcohol use to no more than 1 standard alcoholic beverage per day. (12 ounce beer, 6 ounce wine, one shot liquor)  Hearing Problem:  Pt has already scheduled hearing evaluation.  Immunizations Discussed/Encouraged: Tdap, Influenza, Shingrix, and COVID19  Inactivity/Sedentary: Patient was advised to exercise at least 150 minutes a week per CDC recommendations.  Vision Screening Recommended    The above risks/problems have been discussed with the patient.  Pertinent information has been shared with the patient in the After Visit Summary.  An After Visit Summary and PPPS were made available to the patient.    Follow Up:   Next Medicare Wellness visit to be scheduled in 1 year.         Additional E&M Note during same encounter follows:  Patient has additional, significant, and separately identifiable condition(s)/problem(s) that require work above and beyond the Medicare Wellness Visit     Chief Complaint  Medicare Wellness-subsequent    Subjective   HPI  Juventino is also being seen today for additional medical problem/s.          Juventino was also seen today for management of chronic health conditions including IFG, HLD, and DDD.        Objective   Vital Signs:  /80   Pulse 90   Temp 98.1 °F (36.7 °C) (Infrared)   Ht 193  "cm (76\")   Wt 102 kg (223 lb 12.8 oz)   SpO2 96%   BMI 27.24 kg/m²   Physical Exam  Constitutional:       General: He is not in acute distress.     Appearance: He is well-developed.   HENT:      Right Ear: Tympanic membrane, ear canal and external ear normal.      Left Ear: Tympanic membrane, ear canal and external ear normal.      Nose: Nose normal.      Mouth/Throat:      Mouth: Mucous membranes are moist.      Pharynx: Oropharynx is clear. Uvula midline.   Eyes:      Conjunctiva/sclera: Conjunctivae normal.   Neck:      Thyroid: No thyroid mass or thyromegaly.   Cardiovascular:      Rate and Rhythm: Regular rhythm.      Pulses: Normal pulses.      Heart sounds: S1 normal and S2 normal. No murmur heard.     No friction rub. No gallop.   Pulmonary:      Effort: Pulmonary effort is normal.      Breath sounds: Normal breath sounds. No wheezing, rhonchi or rales.   Musculoskeletal:      Cervical back: Neck supple.   Lymphadenopathy:      Cervical: No cervical adenopathy.   Neurological:      Mental Status: He is alert and oriented to person, place, and time.   Psychiatric:         Attention and Perception: He is attentive.         Speech: Speech normal.         Behavior: Behavior normal.         Thought Content: Thought content normal.         Recent Results (from the past 1344 hour(s))   CBC (No Diff)    Collection Time: 07/26/24  8:23 AM    Specimen: Blood   Result Value Ref Range    WBC 6.32 3.40 - 10.80 10*3/mm3    RBC 5.21 4.14 - 5.80 10*6/mm3    Hemoglobin 16.9 13.0 - 17.7 g/dL    Hematocrit 49.0 37.5 - 51.0 %    MCV 94.0 79.0 - 97.0 fL    MCH 32.4 26.6 - 33.0 pg    MCHC 34.5 31.5 - 35.7 g/dL    RDW 12.2 (L) 12.3 - 15.4 %    Platelets 262 140 - 450 10*3/mm3   Comprehensive Metabolic Panel    Collection Time: 07/26/24  8:23 AM    Specimen: Blood   Result Value Ref Range    Glucose 105 (H) 65 - 99 mg/dL    BUN 14 8 - 23 mg/dL    Creatinine 0.86 0.76 - 1.27 mg/dL    EGFR Result 93.7 >60.0 mL/min/1.73    " BUN/Creatinine Ratio 16.3 7.0 - 25.0    Sodium 140 136 - 145 mmol/L    Potassium 4.4 3.5 - 5.2 mmol/L    Chloride 103 98 - 107 mmol/L    Total CO2 24.6 22.0 - 29.0 mmol/L    Calcium 9.7 8.6 - 10.5 mg/dL    Total Protein 7.1 6.0 - 8.5 g/dL    Albumin 4.3 3.5 - 5.2 g/dL    Globulin 2.8 gm/dL    A/G Ratio 1.5 g/dL    Total Bilirubin 0.7 0.0 - 1.2 mg/dL    Alkaline Phosphatase 89 39 - 117 U/L    AST (SGOT) 35 1 - 40 U/L    ALT (SGPT) 30 1 - 41 U/L   Hemoglobin A1c    Collection Time: 07/26/24  8:23 AM    Specimen: Blood   Result Value Ref Range    Hemoglobin A1C 5.60 4.80 - 5.60 %   Lipid Panel With / Chol / HDL Ratio    Collection Time: 07/26/24  8:23 AM    Specimen: Blood   Result Value Ref Range    Total Cholesterol 253 (H) 0 - 200 mg/dL    Triglycerides 384 (H) 0 - 150 mg/dL    HDL Cholesterol 56 40 - 60 mg/dL    VLDL Cholesterol Armin 68 (H) 5 - 40 mg/dL    LDL Chol Calc (NIH) 129 (H) 0 - 100 mg/dL    Chol/HDL Ratio 4.52    TSH    Collection Time: 07/26/24  8:23 AM    Specimen: Blood   Result Value Ref Range    TSH 4.350 (H) 0.270 - 4.200 uIU/mL   PSA Screen    Collection Time: 07/26/24  8:23 AM    Specimen: Blood   Result Value Ref Range    PSA 1.960 0.000 - 4.000 ng/mL             Assessment and Plan               Encounter for Medicare annual wellness exam    Encounter for screening for malignant neoplasm of prostate    IFG (impaired fasting glucose)  Stable  Patient counseled regarding therapeutic lifestyle changes including improved nutrition, increased exercise, and weight loss.    Mixed hyperlipidemia  Worsening  Patient counseled regarding therapeutic lifestyle changes including improved nutrition, increased exercise, and weight loss.    DDD (degenerative disc disease), lumbar  Patient counseled regarding therapeutic lifestyle changes including improved nutrition, increased exercise, and weight loss.    Abnormal serum thyroid stimulating hormone (TSH) level  Mild  Asymptomatic  Monitor    Abnormal results of  thyroid function studies              Follow Up   Return in about 1 year (around 8/2/2025) for fasting labs one week prior to, Medicare Wellness.  Patient was given instructions and counseling regarding his condition or for health maintenance advice. Please see specific information pulled into the AVS if appropriate.

## 2024-08-02 NOTE — ASSESSMENT & PLAN NOTE
Worsening  Patient counseled regarding therapeutic lifestyle changes including improved nutrition, increased exercise, and weight loss.

## 2025-07-23 DIAGNOSIS — E78.2 MIXED HYPERLIPIDEMIA: Chronic | ICD-10-CM

## 2025-07-23 DIAGNOSIS — R73.01 IFG (IMPAIRED FASTING GLUCOSE): Chronic | ICD-10-CM

## 2025-07-23 DIAGNOSIS — Z12.5 ENCOUNTER FOR SCREENING FOR MALIGNANT NEOPLASM OF PROSTATE: ICD-10-CM

## 2025-07-23 DIAGNOSIS — R79.89 ABNORMAL SERUM THYROID STIMULATING HORMONE (TSH) LEVEL: ICD-10-CM

## 2025-07-23 DIAGNOSIS — R94.6 ABNORMAL RESULTS OF THYROID FUNCTION STUDIES: ICD-10-CM

## 2025-07-30 LAB
ALBUMIN SERPL-MCNC: 4.2 G/DL (ref 3.5–5.2)
ALBUMIN/GLOB SERPL: 1.7 G/DL
ALP SERPL-CCNC: 82 U/L (ref 39–117)
ALT SERPL-CCNC: 26 U/L (ref 1–41)
AST SERPL-CCNC: 28 U/L (ref 1–40)
BILIRUB SERPL-MCNC: 0.9 MG/DL (ref 0–1.2)
BUN SERPL-MCNC: 14 MG/DL (ref 8–23)
BUN/CREAT SERPL: 16.5 (ref 7–25)
CALCIUM SERPL-MCNC: 9.3 MG/DL (ref 8.6–10.5)
CHLORIDE SERPL-SCNC: 104 MMOL/L (ref 98–107)
CHOLEST SERPL-MCNC: 208 MG/DL (ref 0–200)
CHOLEST/HDLC SERPL: 3.92 {RATIO}
CO2 SERPL-SCNC: 28.2 MMOL/L (ref 22–29)
CREAT SERPL-MCNC: 0.85 MG/DL (ref 0.76–1.27)
EGFRCR SERPLBLD CKD-EPI 2021: 93.5 ML/MIN/1.73
ERYTHROCYTE [DISTWIDTH] IN BLOOD BY AUTOMATED COUNT: 11.9 % (ref 12.3–15.4)
GLOBULIN SER CALC-MCNC: 2.5 GM/DL
GLUCOSE SERPL-MCNC: 103 MG/DL (ref 65–99)
HBA1C MFR BLD: 5.6 % (ref 4.8–5.6)
HCT VFR BLD AUTO: 47.5 % (ref 37.5–51)
HDLC SERPL-MCNC: 53 MG/DL (ref 40–60)
HGB BLD-MCNC: 16.3 G/DL (ref 13–17.7)
LDLC SERPL CALC-MCNC: 131 MG/DL (ref 0–100)
MCH RBC QN AUTO: 32.7 PG (ref 26.6–33)
MCHC RBC AUTO-ENTMCNC: 34.3 G/DL (ref 31.5–35.7)
MCV RBC AUTO: 95.2 FL (ref 79–97)
PLATELET # BLD AUTO: 230 10*3/MM3 (ref 140–450)
POTASSIUM SERPL-SCNC: 4.6 MMOL/L (ref 3.5–5.2)
PROT SERPL-MCNC: 6.7 G/DL (ref 6–8.5)
PSA SERPL-MCNC: 2.19 NG/ML (ref 0–4)
RBC # BLD AUTO: 4.99 10*6/MM3 (ref 4.14–5.8)
SODIUM SERPL-SCNC: 142 MMOL/L (ref 136–145)
TRIGL SERPL-MCNC: 136 MG/DL (ref 0–150)
TSH SERPL DL<=0.005 MIU/L-ACNC: 2.26 UIU/ML (ref 0.27–4.2)
VLDLC SERPL CALC-MCNC: 24 MG/DL (ref 5–40)
WBC # BLD AUTO: 5.85 10*3/MM3 (ref 3.4–10.8)

## 2025-08-04 ENCOUNTER — OFFICE VISIT (OUTPATIENT)
Dept: INTERNAL MEDICINE | Facility: CLINIC | Age: 70
End: 2025-08-04
Payer: MEDICARE

## 2025-08-04 VITALS
HEIGHT: 76 IN | WEIGHT: 218.4 LBS | TEMPERATURE: 97.3 F | BODY MASS INDEX: 26.59 KG/M2 | SYSTOLIC BLOOD PRESSURE: 106 MMHG | HEART RATE: 70 BPM | OXYGEN SATURATION: 97 % | DIASTOLIC BLOOD PRESSURE: 74 MMHG

## 2025-08-04 DIAGNOSIS — Z80.0 FAMILY HISTORY OF COLON CANCER: ICD-10-CM

## 2025-08-04 DIAGNOSIS — M51.362 DEGENERATION OF INTERVERTEBRAL DISC OF LUMBAR REGION WITH DISCOGENIC BACK PAIN AND LOWER EXTREMITY PAIN: ICD-10-CM

## 2025-08-04 DIAGNOSIS — Z86.0100 HISTORY OF COLONIC POLYPS: ICD-10-CM

## 2025-08-04 DIAGNOSIS — E78.2 MIXED HYPERLIPIDEMIA: Chronic | ICD-10-CM

## 2025-08-04 DIAGNOSIS — R73.01 IFG (IMPAIRED FASTING GLUCOSE): Chronic | ICD-10-CM

## 2025-08-04 DIAGNOSIS — Z00.00 ENCOUNTER FOR MEDICARE ANNUAL WELLNESS EXAM: Primary | ICD-10-CM

## 2025-08-04 PROCEDURE — 1160F RVW MEDS BY RX/DR IN RCRD: CPT | Performed by: NURSE PRACTITIONER

## 2025-08-04 PROCEDURE — G0439 PPPS, SUBSEQ VISIT: HCPCS | Performed by: NURSE PRACTITIONER

## 2025-08-04 PROCEDURE — 1125F AMNT PAIN NOTED PAIN PRSNT: CPT | Performed by: NURSE PRACTITIONER

## 2025-08-04 PROCEDURE — 99214 OFFICE O/P EST MOD 30 MIN: CPT | Performed by: NURSE PRACTITIONER

## 2025-08-04 PROCEDURE — 1159F MED LIST DOCD IN RCRD: CPT | Performed by: NURSE PRACTITIONER

## (undated) DEVICE — Device

## (undated) DEVICE — TBG PENCL TELESCP MEGADYNE SMOKE EVAC 10FT

## (undated) DEVICE — JACKT LAB F/R KNIT CUFF/COLR XLG BLU

## (undated) DEVICE — CANN NASL CO2 TRULINK W/O2 A/

## (undated) DEVICE — GLV SURG PREMIERPRO ORTHO LTX PF SZ7.5 BRN

## (undated) DEVICE — SPNG LAP 18X18IN LF STRL PK/5

## (undated) DEVICE — SINGLE-USE BIOPSY FORCEPS: Brand: RADIAL JAW 4

## (undated) DEVICE — SUT ETHLN 3/0 PS1 18IN 1663H

## (undated) DEVICE — GOWN ISOL W/THUMB UNIV BLU BX/15

## (undated) DEVICE — KT ORCA ORCAPOD DISP STRL

## (undated) DEVICE — APPL CHLORAPREP HI/LITE 26ML ORNG

## (undated) DEVICE — TRAP FLD MINIVAC MEGADYNE 100ML

## (undated) DEVICE — LOU MINOR PROCEDURE: Brand: MEDLINE INDUSTRIES, INC.

## (undated) DEVICE — PATIENT RETURN ELECTRODE, SINGLE-USE, CONTACT QUALITY MONITORING, ADULT, WITH 9FT CORD, FOR PATIENTS WEIGING OVER 33LBS. (15KG): Brand: MEGADYNE

## (undated) DEVICE — FLEX ADVANTAGE 1500CC: Brand: FLEX ADVANTAGE

## (undated) DEVICE — VIAL FORMLN CAP 10PCT 20ML

## (undated) DEVICE — NDL HYPO PRECISIONGLIDE REG 25G 1 1/2